# Patient Record
Sex: MALE | NOT HISPANIC OR LATINO | URBAN - METROPOLITAN AREA
[De-identification: names, ages, dates, MRNs, and addresses within clinical notes are randomized per-mention and may not be internally consistent; named-entity substitution may affect disease eponyms.]

---

## 2017-10-25 ENCOUNTER — EMERGENCY (EMERGENCY)
Facility: HOSPITAL | Age: 62
LOS: 1 days | Discharge: ANOTHER HOSPITAL | End: 2017-10-25
Attending: EMERGENCY MEDICINE | Admitting: EMERGENCY MEDICINE
Payer: COMMERCIAL

## 2017-10-25 ENCOUNTER — INPATIENT (INPATIENT)
Facility: HOSPITAL | Age: 62
LOS: 2 days | Discharge: HOME | DRG: 274 | End: 2017-10-28
Attending: HOSPITALIST | Admitting: STUDENT IN AN ORGANIZED HEALTH CARE EDUCATION/TRAINING PROGRAM
Payer: COMMERCIAL

## 2017-10-25 VITALS
DIASTOLIC BLOOD PRESSURE: 85 MMHG | RESPIRATION RATE: 12 BRPM | OXYGEN SATURATION: 100 % | HEART RATE: 66 BPM | SYSTOLIC BLOOD PRESSURE: 121 MMHG

## 2017-10-25 VITALS
OXYGEN SATURATION: 100 % | DIASTOLIC BLOOD PRESSURE: 80 MMHG | SYSTOLIC BLOOD PRESSURE: 127 MMHG | HEART RATE: 64 BPM | WEIGHT: 189.6 LBS | TEMPERATURE: 98 F | HEIGHT: 74 IN | RESPIRATION RATE: 15 BRPM

## 2017-10-25 VITALS
RESPIRATION RATE: 19 BRPM | OXYGEN SATURATION: 100 % | TEMPERATURE: 98 F | WEIGHT: 184.09 LBS | HEART RATE: 184 BPM | HEIGHT: 74 IN | DIASTOLIC BLOOD PRESSURE: 94 MMHG | SYSTOLIC BLOOD PRESSURE: 124 MMHG

## 2017-10-25 DIAGNOSIS — Z90.49 ACQUIRED ABSENCE OF OTHER SPECIFIED PARTS OF DIGESTIVE TRACT: Chronic | ICD-10-CM

## 2017-10-25 DIAGNOSIS — I10 ESSENTIAL (PRIMARY) HYPERTENSION: ICD-10-CM

## 2017-10-25 DIAGNOSIS — R07.9 CHEST PAIN, UNSPECIFIED: ICD-10-CM

## 2017-10-25 DIAGNOSIS — R07.89 OTHER CHEST PAIN: ICD-10-CM

## 2017-10-25 DIAGNOSIS — I47.1 SUPRAVENTRICULAR TACHYCARDIA: ICD-10-CM

## 2017-10-25 DIAGNOSIS — Z96.641 PRESENCE OF RIGHT ARTIFICIAL HIP JOINT: Chronic | ICD-10-CM

## 2017-10-25 LAB
ALBUMIN SERPL ELPH-MCNC: 4 G/DL — SIGNIFICANT CHANGE UP (ref 3.3–5)
ALP SERPL-CCNC: 58 U/L — SIGNIFICANT CHANGE UP (ref 30–120)
ALT FLD-CCNC: 23 U/L DA — SIGNIFICANT CHANGE UP (ref 10–60)
ANION GAP SERPL CALC-SCNC: 14 MMOL/L — SIGNIFICANT CHANGE UP (ref 5–17)
ANISOCYTOSIS BLD QL: SLIGHT — SIGNIFICANT CHANGE UP
APTT BLD: 30.4 SEC — SIGNIFICANT CHANGE UP (ref 27.5–37.4)
AST SERPL-CCNC: 31 U/L — SIGNIFICANT CHANGE UP (ref 10–40)
BASOPHILS # BLD AUTO: 0.1 K/UL — SIGNIFICANT CHANGE UP (ref 0–0.2)
BASOPHILS NFR BLD AUTO: 1.5 % — SIGNIFICANT CHANGE UP (ref 0–2)
BILIRUB SERPL-MCNC: 0.8 MG/DL — SIGNIFICANT CHANGE UP (ref 0.2–1.2)
BUN SERPL-MCNC: 17 MG/DL — SIGNIFICANT CHANGE UP (ref 7–23)
CALCIUM SERPL-MCNC: 9.7 MG/DL — SIGNIFICANT CHANGE UP (ref 8.4–10.5)
CHLORIDE SERPL-SCNC: 101 MMOL/L — SIGNIFICANT CHANGE UP (ref 96–108)
CK MB CFR SERPL CALC: 0.8 NG/ML — SIGNIFICANT CHANGE UP (ref 0–3.6)
CO2 SERPL-SCNC: 22 MMOL/L — SIGNIFICANT CHANGE UP (ref 22–31)
CREAT SERPL-MCNC: 0.95 MG/DL — SIGNIFICANT CHANGE UP (ref 0.5–1.3)
D DIMER BLD IA.RAPID-MCNC: <150 NG/ML DDU — SIGNIFICANT CHANGE UP
EOSINOPHIL # BLD AUTO: 0 K/UL — SIGNIFICANT CHANGE UP (ref 0–0.5)
EOSINOPHIL NFR BLD AUTO: 0.5 % — SIGNIFICANT CHANGE UP (ref 0–6)
GLUCOSE SERPL-MCNC: 135 MG/DL — HIGH (ref 70–99)
HCT VFR BLD CALC: 48.3 % — SIGNIFICANT CHANGE UP (ref 39–50)
HGB BLD-MCNC: 16.2 G/DL — SIGNIFICANT CHANGE UP (ref 13–17)
INR BLD: 0.9 RATIO — SIGNIFICANT CHANGE UP (ref 0.88–1.16)
LYMPHOCYTES # BLD AUTO: 1.6 K/UL — SIGNIFICANT CHANGE UP (ref 1–3.3)
LYMPHOCYTES # BLD AUTO: 25.1 % — SIGNIFICANT CHANGE UP (ref 13–44)
MACROCYTES BLD QL: SLIGHT — SIGNIFICANT CHANGE UP
MANUAL SMEAR VERIFICATION: SIGNIFICANT CHANGE UP
MCHC RBC-ENTMCNC: 33.5 GM/DL — SIGNIFICANT CHANGE UP (ref 32–36)
MCHC RBC-ENTMCNC: 36.1 PG — HIGH (ref 27–34)
MCV RBC AUTO: 107.7 FL — HIGH (ref 80–100)
MONOCYTES # BLD AUTO: 0.7 K/UL — SIGNIFICANT CHANGE UP (ref 0–0.9)
MONOCYTES NFR BLD AUTO: 10.6 % — SIGNIFICANT CHANGE UP (ref 2–14)
NEUTROPHILS # BLD AUTO: 4 K/UL — SIGNIFICANT CHANGE UP (ref 1.8–7.4)
NEUTROPHILS NFR BLD AUTO: 62.3 % — SIGNIFICANT CHANGE UP (ref 43–77)
NT-PROBNP SERPL-SCNC: 121 PG/ML — SIGNIFICANT CHANGE UP (ref 0–125)
PLAT MORPH BLD: NORMAL — SIGNIFICANT CHANGE UP
PLATELET # BLD AUTO: 232 K/UL — SIGNIFICANT CHANGE UP (ref 150–400)
PLATELET COUNT - ESTIMATE: NORMAL — SIGNIFICANT CHANGE UP
POIKILOCYTOSIS BLD QL AUTO: SLIGHT — SIGNIFICANT CHANGE UP
POTASSIUM SERPL-MCNC: 3.6 MMOL/L — SIGNIFICANT CHANGE UP (ref 3.5–5.3)
POTASSIUM SERPL-SCNC: 3.6 MMOL/L — SIGNIFICANT CHANGE UP (ref 3.5–5.3)
PROT SERPL-MCNC: 7.8 G/DL — SIGNIFICANT CHANGE UP (ref 6–8.3)
PROTHROM AB SERPL-ACNC: 9.8 SEC — SIGNIFICANT CHANGE UP (ref 9.8–12.7)
RBC # BLD: 4.49 M/UL — SIGNIFICANT CHANGE UP (ref 4.2–5.8)
RBC # FLD: 11.8 % — SIGNIFICANT CHANGE UP (ref 10.3–14.5)
RBC BLD AUTO: ABNORMAL
SODIUM SERPL-SCNC: 137 MMOL/L — SIGNIFICANT CHANGE UP (ref 135–145)
TROPONIN I SERPL-MCNC: 0 NG/ML — LOW (ref 0.02–0.06)
WBC # BLD: 6.4 K/UL — SIGNIFICANT CHANGE UP (ref 3.8–10.5)
WBC # FLD AUTO: 6.4 K/UL — SIGNIFICANT CHANGE UP (ref 3.8–10.5)

## 2017-10-25 PROCEDURE — 82553 CREATINE MB FRACTION: CPT

## 2017-10-25 PROCEDURE — 99285 EMERGENCY DEPT VISIT HI MDM: CPT

## 2017-10-25 PROCEDURE — 83880 ASSAY OF NATRIURETIC PEPTIDE: CPT

## 2017-10-25 PROCEDURE — 85027 COMPLETE CBC AUTOMATED: CPT

## 2017-10-25 PROCEDURE — 71010: CPT | Mod: 26

## 2017-10-25 PROCEDURE — 85730 THROMBOPLASTIN TIME PARTIAL: CPT

## 2017-10-25 PROCEDURE — 85379 FIBRIN DEGRADATION QUANT: CPT

## 2017-10-25 PROCEDURE — 71045 X-RAY EXAM CHEST 1 VIEW: CPT

## 2017-10-25 PROCEDURE — 84484 ASSAY OF TROPONIN QUANT: CPT

## 2017-10-25 PROCEDURE — 80053 COMPREHEN METABOLIC PANEL: CPT

## 2017-10-25 PROCEDURE — 36415 COLL VENOUS BLD VENIPUNCTURE: CPT

## 2017-10-25 PROCEDURE — 93010 ELECTROCARDIOGRAM REPORT: CPT | Mod: 76

## 2017-10-25 PROCEDURE — 99223 1ST HOSP IP/OBS HIGH 75: CPT

## 2017-10-25 PROCEDURE — 85610 PROTHROMBIN TIME: CPT

## 2017-10-25 RX ORDER — METOPROLOL TARTRATE 50 MG
12.5 TABLET ORAL ONCE
Qty: 0 | Refills: 0 | Status: COMPLETED | OUTPATIENT
Start: 2017-10-25 | End: 2017-10-25

## 2017-10-25 RX ORDER — ASPIRIN/CALCIUM CARB/MAGNESIUM 324 MG
325 TABLET ORAL ONCE
Qty: 0 | Refills: 0 | Status: COMPLETED | OUTPATIENT
Start: 2017-10-25 | End: 2017-10-25

## 2017-10-25 RX ORDER — ONDANSETRON 8 MG/1
4 TABLET, FILM COATED ORAL ONCE
Qty: 0 | Refills: 0 | Status: COMPLETED | OUTPATIENT
Start: 2017-10-25 | End: 2017-10-25

## 2017-10-25 RX ORDER — POTASSIUM CHLORIDE 20 MEQ
20 PACKET (EA) ORAL ONCE
Qty: 0 | Refills: 0 | Status: COMPLETED | OUTPATIENT
Start: 2017-10-25 | End: 2017-10-25

## 2017-10-25 RX ORDER — SODIUM CHLORIDE 9 MG/ML
3 INJECTION INTRAMUSCULAR; INTRAVENOUS; SUBCUTANEOUS ONCE
Qty: 0 | Refills: 0 | Status: COMPLETED | OUTPATIENT
Start: 2017-10-25 | End: 2017-10-25

## 2017-10-25 RX ORDER — ENOXAPARIN SODIUM 100 MG/ML
80 INJECTION SUBCUTANEOUS ONCE
Qty: 0 | Refills: 0 | Status: COMPLETED | OUTPATIENT
Start: 2017-10-25 | End: 2017-10-25

## 2017-10-25 RX ADMIN — ONDANSETRON 4 MILLIGRAM(S): 8 TABLET, FILM COATED ORAL at 22:38

## 2017-10-25 RX ADMIN — ENOXAPARIN SODIUM 80 MILLIGRAM(S): 100 INJECTION SUBCUTANEOUS at 21:53

## 2017-10-25 RX ADMIN — Medication 20 MILLIEQUIVALENT(S): at 21:52

## 2017-10-25 RX ADMIN — SODIUM CHLORIDE 3 MILLILITER(S): 9 INJECTION INTRAMUSCULAR; INTRAVENOUS; SUBCUTANEOUS at 19:27

## 2017-10-25 RX ADMIN — Medication 325 MILLIGRAM(S): at 20:55

## 2017-10-25 RX ADMIN — Medication 12.5 MILLIGRAM(S): at 21:52

## 2017-10-25 NOTE — PATIENT PROFILE ADULT. - ABILITY TO HEAR (WITH HEARING AID OR HEARING APPLIANCE IF NORMALLY USED):
wears hearing aides/Mildly to Moderately Impaired: difficulty hearing in some environments or speaker may need to increase volume or speak distinctly

## 2017-10-25 NOTE — H&P ADULT - PROBLEM SELECTOR PLAN 3
Patient with h/o hereditary hemochromatosis, receives thrice yearly phlebotomy as treatment  - monitor

## 2017-10-25 NOTE — H&P ADULT - NSHPPHYSICALEXAM_GEN_ALL_CORE
PHYSICAL EXAM:  GENERAL: NAD, well-developed  HEAD: Atraumatic, Normocephalic  EYES: EOMI, PERRLA, conjunctiva and sclera clear  NECK: Supple, No JVD  CHEST/LUNG: Clear to auscultation bilaterally; No wheezes/rales/rhonchi  HEART: Regular rate and rhythm; No murmurs, rubs, or gallops  ABDOMEN: Soft, Nontender, Nondistended; Bowel sounds present  EXTREMITIES:  2+ dP pulses b/l, No clubbing, cyanosis, or edema  PSYCH: reactive affect  NEUROLOGY: AAOx3, non-focal  SKIN: No rashes or lesions PHYSICAL EXAM:  Vital Signs: T(F): 98.1 (25 Oct 2017 23:18), Max: 98.1 (25 Oct 2017 23:18), HR: 62 (25 Oct 2017 23:30) (62 - 184), BP: 119/80 (25 Oct 2017 23:30) (119/80 - 127/80), RR: 16 (25 Oct 2017 23:30) (12 - 19), SpO2: 99% (25 Oct 2017 23:30) (99% - 100%)  GENERAL: NAD, well-developed  HEAD: Atraumatic, Normocephalic  EYES: EOMI, PERRLA, conjunctiva and sclera clear  NECK: Supple, No JVD  CHEST/LUNG: Clear to auscultation bilaterally; No wheezes/rales/rhonchi  HEART: Regular rate and rhythm; No murmurs, rubs, or gallops  ABDOMEN: Soft, Nontender, Nondistended; Bowel sounds present  EXTREMITIES:  2+ dP pulses b/l, No clubbing, cyanosis, or edema  PSYCH: reactive affect  NEUROLOGY: AAOx3, non-focal  SKIN: No rashes or lesions

## 2017-10-25 NOTE — H&P ADULT - PROBLEM SELECTOR PLAN 1
Patient presented with episode of SVT with diffuse ST depression and elevation in AvR on EKG concerning for ischemia converted to NSR s/p adenosine x 3 and cardizem x 1  - admit to CCU for close monitoring  - will likely need ischemic work up given ST-T changes on EKG  - check TTE  - trend Papo (one set negative so far at OSH)

## 2017-10-25 NOTE — H&P ADULT - NSHPSOCIALHISTORY_GEN_ALL_CORE
He is an intermittent cigar smoker, approximately twice per month, and regular wine drinker, having 1-2 glasses daily. Denies recreational drug use. He works in logistics requiring frequent travel.

## 2017-10-25 NOTE — ED PROVIDER NOTE - MEDICAL DECISION MAKING DETAILS
63 y.o. M with SVT, cp/back pain, improved to NSR after 3 doses adenosine and 1 dose diltiazem, abnormal EKG, first set enz negative, seen by cardiology, recommends transfer to [facility] for cath and ep study

## 2017-10-25 NOTE — H&P ADULT - ASSESSMENT
61 yo M w/PMH of hemochromatosis treated with phlebotomy 3 x per year, chronic fatigue syndrome and HTN who presented to [facility] ED c/o CP radiating to his back associated with palpitations, SOB and dizziness found to have SVT at 179bpm with ST depressions throughout and elevations in AvR transferred to [facility] for ischemic work up

## 2017-10-25 NOTE — CONSULT NOTE ADULT - ASSESSMENT
Mr Kate is  a 63 yo male with hx. of HTN,hemachromatosis, chronic fatigue syndrome visiting fro [place] who developed palpitations with sharp- dull left anterior chest pain radiating to his upper back and left shoulder while at rest. Mild worsening with inspiration. No relation to postural changes or exertion. He was diaphoretic, dizzy  and nauseous without vomiting. Ambulance was called and he was found to be in SVT at 180 BPM. He received 2 doses of Adenosine in the ambulance without any change. In the ER he was given additional dose of Adenosine and then a dose odf IV Cardizem with conversion to sinus rhythm. After conversion he relates chest discomfort ( which was 10/10) has almost resolved with only mild residual ache present. He is no longer SOB.    He relates experiencing a similar but less intense episode approx. 1 year ago for which he went to an ER in [place] and apparently converted with ? iV medication. He has had intermittent , very brief, self limited episodes. He relates that episodes often resolve by drinking cold H2O.  He was seen by a cardiologist in [place]  and relates having had a negative stress test ( without imaging).     He typically denies Sob or Stokes, exertional chest pain. No ankle edema, presyncope or syncope.     He exercises regularly ( swims for 30 min) without Sob or chest pain.    Risk factors for CAD include HTN ,hyperlipidemia. He denies cigarette smoking, DM. He denies FH of CAd

## 2017-10-25 NOTE — ED ADULT NURSE NOTE - ED STAT RN HANDOFF DETAILS
Report given to CCU MARLYN Perdue via transfer center by Yessy. Patient aware of plan of care. Cardiac monitor in place. See flow sheet for   VS. Will continue to monitor.

## 2017-10-25 NOTE — H&P ADULT - NSHPREVIEWOFSYSTEMS_GEN_ALL_CORE
Constitutional: denies fevers, chills, night sweats, weight loss  HEENT: denies visual changes, hearing changes, rhinitis, odynophagia, or dysphagia  Cardiovascular: denies palpitations, chest pain, edema  Respiratory: denies SOB, wheezing  Gastrointestinal: denies N/V/D, abdominal pain, hematochezia, melena  : denies dysuria, hematuria  MSK: denies weakness, joint pain  Neuro: no numbness or tingling  Psych: no depression or anxiety  Skin: denies new rashes or masses

## 2017-10-25 NOTE — ED PROVIDER NOTE - OBJECTIVE STATEMENT
63 y/o M pt with hx of hemochromatosis and HTNpresents to the ED by ambulance c/o chest pain radiating to his back. Pt states had an episode like this a year ago, drank cold water and it went away. Pt is also c/o shortness of breath and dizziness. Pt was given adenosine x2 in the ambulance. Pt took 3 Excedrin at [facility]. Pt arrived from [place] 3 days ago. Pt takes Hydrochlorothiazide and Losartan. Denies nausea, vomiting. No further complaints at this time. 63 y/o M pt with hx of hemochromatosis and HTN presents to the ED by ambulance c/o chest pain radiating to his back. Pt states had an episode like this a year ago, drank cold water and it went away. Pt is also c/o shortness of breath and dizziness. Pt was given adenosine x2 in the ambulance. Pt took 3 Excedrin at [facility]. Pt arrived from [place] 3 days ago. Pt takes Hydrochlorothiazide and Losartan. Denies nausea, vomiting. No further complaints at this time. 63 y/o M pt with hx of hemochromatosis and HTN presents to the ED by ambulance c/o chest pain radiating to his back. Pt states had an episode like this a year ago, drank cold water and it went away. Pt is also c/o shortness of breath and dizziness. Pt was given adenosine x2 in the ambulance. Pt took 3 Excedrin at [facility]. Pt arrived from [place] 3 days ago. Pt takes Hydrochlorothiazide and Losartan. Denies nausea, vomiting. No further complaints at this time.    Son Jameson

## 2017-10-25 NOTE — CONSULT NOTE ADULT - SUBJECTIVE AND OBJECTIVE BOX
HPI:  Mr Kate is  a 61 yo male with hx. of HTN,hemachromatosis, chronic fatigue syndrome visiting fro [place] who developed palpitations with sharp- dull left anterior chest pain radiating to his upper back and left shoulder while at rest. Mild worsening with inspiration. No relation to postural changes or exertion. He was diaphoretic, dizzy  and nauseous without vomiting. Ambulance was called and he was found to be in SVT at 180 BPM. He received 2 doses of Adenosine in the ambulance without any change. In the ER he was given additional dose of Adenosine and then a dose odf IV Cardizem with conversion to sinus rhythm. After conversion he relates chest discomfort ( which was 10/10) has almost resolved with only mild residual ache present. He is no longer SOB.    He relates experiencing a similar but less intense episode approx. 1 year ago for which he went to an ER in [place] and apparently converted with ? iV medication. He has had intermittent , very brief, self limited episodes. He relates that episodes often resolve by drinking cold H2O.  He was seen by a cardiologist in [place]  and relates having had a negative stress test ( without imaging).     He typically denies Sob or Stokes, exertional chest pain. No ankle edema, presyncope or syncope.     He exercises regularly ( swims for 30 min) without Sob or chest pain.    Risk factors for CAD include HTN ,hyperlipidemia. He denies cigarette smoking, DM. He denies FH of CAd      PAST MEDICAL & SURGICAL HISTORY:  HTN (hypertension)  Right THR  cholecystectomy.       Allergies    No Known Allergies      SOCIAL HISTORY: 1-2 glasses wine/ day.  No cigarettes.    FAMILY HISTORY: Not contributory.       MEDICATIONS: Losartan /HcTZ      REVIEW OF SYSTEMS:    CONSTITUTIONAL: see above occasional HA ( not presently0.   EYES/ENT: No visual changes;   RESPIRATORY see HPI  CARDIOVASCULAR: see HPI  GASTROINTESTINAL: see HPI.     All other review of systems is negative/ not contributory  unless indicated above    Vital Signs Last 24 Hrs  T(C): 36.7 (25 Oct 2017 18:50), Max: 36.7 (25 Oct 2017 18:50)  T(F): 98 (25 Oct 2017 18:50), Max: 98 (25 Oct 2017 18:50)  HR: 70 (25 Oct 2017 19:47) (62 - 184)  BP: 124/80 (25 Oct 2017 19:47) (123/80 - 124/94)  BP(mean): --  RR: 14 (25 Oct 2017 19:47) (12 - 19)  SpO2: 100% (25 Oct 2017 19:47) (100% - 100%)    I&O's Summary      PHYSICAL EXAM:    Constitutional: , awake and alert, well-developed; mild chest discomfort.   HEENT: NC/AT. Anicteric. No oral cyanosis.   Neck:  supple,  No JVD  Respiratory: Breath sounds are clear bilaterally, No wheezing, rales or rhonchi  Cardiovascular: S1 and S2, regular  rhythm, no Murmurs, gallops or rubs  Gastrointestinal: Bowel Sounds present, soft, nontender.   Extremities: No peripheral edema. No clubbing or cyanosis.  Vascular: 2+ peripheral pulses  Neurological: A/O x 3,  Musculoskeletal: no calf tenderness.  Skin: No rashes.      LABS: All Labs Reviewed:                        16.2   6.4   )-----------( 232      ( 25 Oct 2017 19:26 )             48.3     25 Oct 2017 19:26    137    |  101    |  17     ----------------------------<  135    3.6     |  22     |  0.95     Ca    9.7        25 Oct 2017 19:26    TPro  7.8    /  Alb  4.0    /  TBili  0.8    /  DBili  x      /  AST  31     /  ALT  23     /  AlkPhos  58     25 Oct 2017 19:26    PT/INR - ( 25 Oct 2017 19:26 )   PT: 9.8 sec;   INR: 0.90 ratio         PTT - ( 25 Oct 2017 19:26 )  PTT:30.4 sec  CARDIAC MARKERS ( 25 Oct 2017 19:26 )  .000 ng/mL / x     / x     / x     / 0.8 ng/mL    D-dimer - nl.           10-25 @ 19:26  Pro Bnp 121    RADIOLOGY/EKG:    EKG 10/25/17 - 18:53:12 -  bPM. ST depression V3-V6; lead 1 and inferiorly    EKG 10/25/17 - 19:00:38 - Sinus rhythm 62 BPM. St depression V3-V6; lead 2, avf ( less than before)    EKG 10/25/17 - 19:19;50 - sinus herminio 59 BPM. decreased ST depression.     EKG 10/25/17 - 19:19:50 - Sinus herminio 59 BPM - Decresed ST depression

## 2017-10-25 NOTE — CONSULT NOTE ADULT - PROBLEM SELECTOR RECOMMENDATION 2
severe as described above. + ischemic St abnl. when in SVT. Troponin x1 negative ( as was d-dimer and proBNP). Presently with only mild residual ache and improved St depression. Will need evaluation for CAD. Given severity of presenting scenario suggest cardiac catheterization. Has received ASA. Advise beta blocker and SQ full dose Lovenox for now. Check lipid status. Echocardiogram

## 2017-10-25 NOTE — ED PROVIDER NOTE - MUSCULOSKELETAL, MLM
+COMPLAINING OF BACK PAIN. Spine appears normal, range of motion is not limited, no muscle or joint tenderness

## 2017-10-25 NOTE — CONSULT NOTE ADULT - PROBLEM SELECTOR RECOMMENDATION 9
associated with severe chest pain and SOB. EKG with diffuse ST depressions while in SVT with improvement upon conversion to sinus rhythm. Has had similar, but less intense episodes in the past. Suggest EP consult re: possible ablation. Begin on oral betas blocker. check mg+ level and TFTs. Replete K+.

## 2017-10-25 NOTE — ED PROVIDER NOTE - NOTES
Here for consult fellow states not STEMI, and no CP, only pain left shoulder, does not need cath right now.  21:13 - pt has been accepted by Dr. Boateng for transfer Here for consult - rec transfer to [facility], cath and EP study

## 2017-10-25 NOTE — H&P ADULT - HISTORY OF PRESENT ILLNESS
Patient is a 61 yo M w/PMH of hemochromatosis treated with phlebotomy 3 x per year, chronic fatigue syndrome and HTN who presented to [facility] ED c/o CP radiating to his back associated with palpitations, SOB and dizziness found to have SVT at 180bpm. In the ambulance, he received 2 doses of adenosine in the ambulance without any change, then received an additional dose of adenosine and IV cardizem in the ED with conversion to NSR. The patient reports a similar episode one year ago for which he presented to an ER in [place] (his home). Reports that he believes the rhythm converted on its own. He saw a cardiologist afterwards and underwent echo and stress test which were both reportedly normal. States he has had intermittent episodes that usually resolve with drinking cold water or spontaneously for a few years. States he has never had an EKG during an episode that showed the rhythm and has never worn a holter monitor.    He is currently in NSR, states his chest pain and SOB completely resolved after conversion. Reports some continued left shoulder discomfort. Denies fevers, chills, nausea, vomiting. Reports he is an intermittent cigar smoker, approximately twice per month, and regular wine drinker, having 1-2 glasses daily. Denies recreational drug use.    On presentation to the [facility] ED, VS: T 98    /94  RR 19  SpO2 100% on RA  CBC, CMP unremarkable. Papo negative x 1. CXR clear. He was given  x 1, Lovenox 80 Sq x 1, Metoprolol 12.5mg x 1 and 20mEq of K.    On presentation to [facility], VS: T 98.1  HR 64  /80  RR 15  SpO2 100% on O2 by NC

## 2017-10-25 NOTE — ED ADULT NURSE NOTE - OBJECTIVE STATEMENT
Pt c/o chest pain radiating to back 10/10 pain pt has h/o hypertension denies any extensive exercise before chest pain onset. pt arrives [place] with rapid heart rate >130 bpm

## 2017-10-26 DIAGNOSIS — I47.1 SUPRAVENTRICULAR TACHYCARDIA: ICD-10-CM

## 2017-10-26 DIAGNOSIS — I10 ESSENTIAL (PRIMARY) HYPERTENSION: ICD-10-CM

## 2017-10-26 DIAGNOSIS — E83.119 HEMOCHROMATOSIS, UNSPECIFIED: ICD-10-CM

## 2017-10-26 DIAGNOSIS — Z29.9 ENCOUNTER FOR PROPHYLACTIC MEASURES, UNSPECIFIED: ICD-10-CM

## 2017-10-26 LAB
ALBUMIN SERPL ELPH-MCNC: 4 G/DL — SIGNIFICANT CHANGE UP (ref 3.3–5)
ALBUMIN SERPL ELPH-MCNC: 4.1 G/DL — SIGNIFICANT CHANGE UP (ref 3.3–5)
ALP SERPL-CCNC: 41 U/L — SIGNIFICANT CHANGE UP (ref 40–120)
ALP SERPL-CCNC: 46 U/L — SIGNIFICANT CHANGE UP (ref 40–120)
ALT FLD-CCNC: 16 U/L RC — SIGNIFICANT CHANGE UP (ref 10–45)
ALT FLD-CCNC: 19 U/L RC — SIGNIFICANT CHANGE UP (ref 10–45)
ANION GAP SERPL CALC-SCNC: 12 MMOL/L — SIGNIFICANT CHANGE UP (ref 5–17)
ANION GAP SERPL CALC-SCNC: 13 MMOL/L — SIGNIFICANT CHANGE UP (ref 5–17)
APTT BLD: 33.2 SEC — SIGNIFICANT CHANGE UP (ref 27.5–37.4)
AST SERPL-CCNC: 27 U/L — SIGNIFICANT CHANGE UP (ref 10–40)
AST SERPL-CCNC: 32 U/L — SIGNIFICANT CHANGE UP (ref 10–40)
BASOPHILS # BLD AUTO: 0 K/UL — SIGNIFICANT CHANGE UP (ref 0–0.2)
BASOPHILS NFR BLD AUTO: 0.6 % — SIGNIFICANT CHANGE UP (ref 0–2)
BILIRUB SERPL-MCNC: 0.7 MG/DL — SIGNIFICANT CHANGE UP (ref 0.2–1.2)
BILIRUB SERPL-MCNC: 0.8 MG/DL — SIGNIFICANT CHANGE UP (ref 0.2–1.2)
BLD GP AB SCN SERPL QL: NEGATIVE — SIGNIFICANT CHANGE UP
BUN SERPL-MCNC: 14 MG/DL — SIGNIFICANT CHANGE UP (ref 7–23)
BUN SERPL-MCNC: 17 MG/DL — SIGNIFICANT CHANGE UP (ref 7–23)
CALCIUM SERPL-MCNC: 9 MG/DL — SIGNIFICANT CHANGE UP (ref 8.4–10.5)
CALCIUM SERPL-MCNC: 9.4 MG/DL — SIGNIFICANT CHANGE UP (ref 8.4–10.5)
CHLORIDE SERPL-SCNC: 101 MMOL/L — SIGNIFICANT CHANGE UP (ref 96–108)
CHLORIDE SERPL-SCNC: 104 MMOL/L — SIGNIFICANT CHANGE UP (ref 96–108)
CHOLEST SERPL-MCNC: 230 MG/DL — HIGH (ref 10–199)
CK MB CFR SERPL CALC: 2.7 NG/ML — SIGNIFICANT CHANGE UP (ref 0–6.7)
CK MB CFR SERPL CALC: 2.7 NG/ML — SIGNIFICANT CHANGE UP (ref 0–6.7)
CK SERPL-CCNC: 77 U/L — SIGNIFICANT CHANGE UP (ref 30–200)
CK SERPL-CCNC: 86 U/L — SIGNIFICANT CHANGE UP (ref 30–200)
CO2 SERPL-SCNC: 24 MMOL/L — SIGNIFICANT CHANGE UP (ref 22–31)
CO2 SERPL-SCNC: 24 MMOL/L — SIGNIFICANT CHANGE UP (ref 22–31)
CREAT SERPL-MCNC: 0.89 MG/DL — SIGNIFICANT CHANGE UP (ref 0.5–1.3)
CREAT SERPL-MCNC: 1.05 MG/DL — SIGNIFICANT CHANGE UP (ref 0.5–1.3)
EOSINOPHIL # BLD AUTO: 0.1 K/UL — SIGNIFICANT CHANGE UP (ref 0–0.5)
EOSINOPHIL NFR BLD AUTO: 1.2 % — SIGNIFICANT CHANGE UP (ref 0–6)
GLUCOSE SERPL-MCNC: 89 MG/DL — SIGNIFICANT CHANGE UP (ref 70–99)
GLUCOSE SERPL-MCNC: 96 MG/DL — SIGNIFICANT CHANGE UP (ref 70–99)
HBA1C BLD-MCNC: 5.2 % — SIGNIFICANT CHANGE UP (ref 4–5.6)
HCT VFR BLD CALC: 42.7 % — SIGNIFICANT CHANGE UP (ref 39–50)
HCT VFR BLD CALC: 42.9 % — SIGNIFICANT CHANGE UP (ref 39–50)
HDLC SERPL-MCNC: 93 MG/DL — SIGNIFICANT CHANGE UP (ref 40–125)
HGB BLD-MCNC: 14.9 G/DL — SIGNIFICANT CHANGE UP (ref 13–17)
HGB BLD-MCNC: 15.2 G/DL — SIGNIFICANT CHANGE UP (ref 13–17)
INR BLD: 0.97 RATIO — SIGNIFICANT CHANGE UP (ref 0.88–1.16)
LIPID PNL WITH DIRECT LDL SERPL: 128 MG/DL — SIGNIFICANT CHANGE UP
LYMPHOCYTES # BLD AUTO: 1.8 K/UL — SIGNIFICANT CHANGE UP (ref 1–3.3)
LYMPHOCYTES # BLD AUTO: 35 % — SIGNIFICANT CHANGE UP (ref 13–44)
MAGNESIUM SERPL-MCNC: 2.3 MG/DL — SIGNIFICANT CHANGE UP (ref 1.6–2.6)
MAGNESIUM SERPL-MCNC: 2.3 MG/DL — SIGNIFICANT CHANGE UP (ref 1.6–2.6)
MCHC RBC-ENTMCNC: 34.9 GM/DL — SIGNIFICANT CHANGE UP (ref 32–36)
MCHC RBC-ENTMCNC: 35.3 GM/DL — SIGNIFICANT CHANGE UP (ref 32–36)
MCHC RBC-ENTMCNC: 38.2 PG — HIGH (ref 27–34)
MCHC RBC-ENTMCNC: 38.7 PG — HIGH (ref 27–34)
MCV RBC AUTO: 109 FL — HIGH (ref 80–100)
MCV RBC AUTO: 110 FL — HIGH (ref 80–100)
MONOCYTES # BLD AUTO: 0.7 K/UL — SIGNIFICANT CHANGE UP (ref 0–0.9)
MONOCYTES NFR BLD AUTO: 13 % — SIGNIFICANT CHANGE UP (ref 2–14)
NEUTROPHILS # BLD AUTO: 2.5 K/UL — SIGNIFICANT CHANGE UP (ref 1.8–7.4)
NEUTROPHILS NFR BLD AUTO: 50.2 % — SIGNIFICANT CHANGE UP (ref 43–77)
PHOSPHATE SERPL-MCNC: 3.8 MG/DL — SIGNIFICANT CHANGE UP (ref 2.5–4.5)
PHOSPHATE SERPL-MCNC: 4.6 MG/DL — HIGH (ref 2.5–4.5)
PLATELET # BLD AUTO: 211 K/UL — SIGNIFICANT CHANGE UP (ref 150–400)
PLATELET # BLD AUTO: 215 K/UL — SIGNIFICANT CHANGE UP (ref 150–400)
POTASSIUM SERPL-MCNC: 4.2 MMOL/L — SIGNIFICANT CHANGE UP (ref 3.5–5.3)
POTASSIUM SERPL-MCNC: 5.1 MMOL/L — SIGNIFICANT CHANGE UP (ref 3.5–5.3)
POTASSIUM SERPL-SCNC: 4.2 MMOL/L — SIGNIFICANT CHANGE UP (ref 3.5–5.3)
POTASSIUM SERPL-SCNC: 5.1 MMOL/L — SIGNIFICANT CHANGE UP (ref 3.5–5.3)
PROT SERPL-MCNC: 6 G/DL — SIGNIFICANT CHANGE UP (ref 6–8.3)
PROT SERPL-MCNC: 6.7 G/DL — SIGNIFICANT CHANGE UP (ref 6–8.3)
PROTHROM AB SERPL-ACNC: 10.5 SEC — SIGNIFICANT CHANGE UP (ref 9.8–12.7)
RBC # BLD: 3.91 M/UL — LOW (ref 4.2–5.8)
RBC # BLD: 3.92 M/UL — LOW (ref 4.2–5.8)
RBC # FLD: 11.5 % — SIGNIFICANT CHANGE UP (ref 10.3–14.5)
RBC # FLD: 11.7 % — SIGNIFICANT CHANGE UP (ref 10.3–14.5)
RH IG SCN BLD-IMP: NEGATIVE — SIGNIFICANT CHANGE UP
SODIUM SERPL-SCNC: 138 MMOL/L — SIGNIFICANT CHANGE UP (ref 135–145)
SODIUM SERPL-SCNC: 140 MMOL/L — SIGNIFICANT CHANGE UP (ref 135–145)
TOTAL CHOLESTEROL/HDL RATIO MEASUREMENT: 2.5 RATIO — LOW (ref 3.4–9.6)
TRIGL SERPL-MCNC: 44 MG/DL — SIGNIFICANT CHANGE UP (ref 10–149)
TROPONIN T SERPL-MCNC: 0.04 NG/ML — SIGNIFICANT CHANGE UP (ref 0–0.06)
TROPONIN T SERPL-MCNC: <0.01 NG/ML — SIGNIFICANT CHANGE UP (ref 0–0.06)
TSH SERPL-MCNC: 1.88 UIU/ML — SIGNIFICANT CHANGE UP (ref 0.27–4.2)
WBC # BLD: 5 K/UL — SIGNIFICANT CHANGE UP (ref 3.8–10.5)
WBC # BLD: 7.3 K/UL — SIGNIFICANT CHANGE UP (ref 3.8–10.5)
WBC # FLD AUTO: 5 K/UL — SIGNIFICANT CHANGE UP (ref 3.8–10.5)
WBC # FLD AUTO: 7.3 K/UL — SIGNIFICANT CHANGE UP (ref 3.8–10.5)

## 2017-10-26 PROCEDURE — 93306 TTE W/DOPPLER COMPLETE: CPT | Mod: 26

## 2017-10-26 PROCEDURE — 99291 CRITICAL CARE FIRST HOUR: CPT

## 2017-10-26 PROCEDURE — 99152 MOD SED SAME PHYS/QHP 5/>YRS: CPT | Mod: GC

## 2017-10-26 PROCEDURE — 93454 CORONARY ARTERY ANGIO S&I: CPT | Mod: 26,GC

## 2017-10-26 PROCEDURE — 93010 ELECTROCARDIOGRAM REPORT: CPT

## 2017-10-26 RX ORDER — LOSARTAN POTASSIUM 100 MG/1
0 TABLET, FILM COATED ORAL
Qty: 0 | Refills: 0 | COMMUNITY

## 2017-10-26 RX ORDER — METOPROLOL TARTRATE 50 MG
12.5 TABLET ORAL
Qty: 0 | Refills: 0 | Status: DISCONTINUED | OUTPATIENT
Start: 2017-10-26 | End: 2017-10-28

## 2017-10-26 RX ORDER — HYDROCHLOROTHIAZIDE 25 MG
1 TABLET ORAL
Qty: 0 | Refills: 0 | COMMUNITY

## 2017-10-26 RX ORDER — PREGABALIN 225 MG/1
1 CAPSULE ORAL
Qty: 0 | Refills: 0 | COMMUNITY

## 2017-10-26 RX ORDER — METOPROLOL TARTRATE 50 MG
12.5 TABLET ORAL
Qty: 0 | Refills: 0 | Status: DISCONTINUED | OUTPATIENT
Start: 2017-10-26 | End: 2017-10-26

## 2017-10-26 RX ADMIN — Medication 12.5 MILLIGRAM(S): at 15:26

## 2017-10-26 NOTE — CONSULT NOTE ADULT - ASSESSMENT
63 yo M w/PMH of hemochromatosis treated with phlebotomy 3 x per year, chronic fatigue syndrome and HTN who presented to [facility] ED c/o CP radiating to his back associated with palpitations, SOB and dizziness found to have SVT at 180bpm likely AVNRT that is adenosine/diltiazem responsive.    -- will discuss ablation vs medical management with EP attending    Charli Nichols MD

## 2017-10-26 NOTE — CHART NOTE - NSCHARTNOTEFT_GEN_A_CORE
63 yo M w/PMH of hemochromatosis treated with phlebotomy 3 x per year, chronic fatigue syndrome and HTN who presented to [facility] ED c/o CP radiating to his back associated with palpitations, SOB and dizziness found to have SVT at 180bpm. In the ambulance, he received 2 doses of adenosine without any change, then received an additional dose of adenosine and IV cardizem in the ED with conversion to NSR. Pt currently in NSR, states his chest pain and SOB completely resolved after conversion. Reports some continued left shoulder discomfort.  Cardiac enzymes negative w/ non-specific ischemic changes on ECG (ST depressions precordially, AVR KUN). TTE (10/26) shows normal LA w/ normal LV systolic function. Left heart cath (10/26) shows non-obstructive CAD. Pt started on metoprolol 12.5mg po BID for stable angina/AVNRT. Pt is NPO after midnight for EP procedure. 61 yo M w/PMH of hemochromatosis treated with phlebotomy 3 x per year, chronic fatigue syndrome and HTN who presented to [facility] ED c/o CP radiating to his back associated with palpitations, SOB and dizziness found to have SVT at 180bpm. In the ambulance, he received 2 doses of adenosine without any change, then received an additional dose of adenosine and IV cardizem in the ED with conversion to NSR. Pt currently in NSR, states his chest pain and SOB completely resolved after conversion. Reports some continued left shoulder discomfort.  Cardiac enzymes negative w/ non-specific ischemic changes on ECG (ST depressions precordially, AVR KUN). TTE (10/26) shows normal LA w/ normal LV systolic function. Left heart cath (10/26) shows non-obstructive CAD. Pt started on metoprolol 12.5mg po BID for stable angina/AVNRT. Pt is NPO after midnight for EP study tmw AM. EP following.

## 2017-10-26 NOTE — CONSULT NOTE ADULT - SUBJECTIVE AND OBJECTIVE BOX
Patient seen and evaluated @ 4 AM  Chief Complaint: Chest pain    HPI:  Patient is a 61 yo M w/PMH of hemochromatosis treated with phlebotomy 3 x per year, chronic fatigue syndrome and HTN who presented to [facility] ED c/o CP radiating to his back associated with palpitations, SOB and dizziness found to have SVT at 180bpm. In the ambulance, he received 2 doses of adenosine in the ambulance without any change, then received an additional dose of adenosine and IV cardizem in the ED with conversion to NSR. The patient reports a similar episode one year ago for which he presented to an ER in [place] (his home). Reports that he believes the rhythm converted on its own. He saw a cardiologist afterwards and underwent echo and stress test which were both reportedly normal. States he has had intermittent episodes that usually resolve with drinking cold water or spontaneously for a few years. States he has never had an EKG during an episode that showed the rhythm and has never worn a holter monitor.    He is currently in NSR, states his chest pain and SOB completely resolved after conversion. Reports some continued left shoulder discomfort. Denies fevers, chills, nausea, vomiting. Reports he is an intermittent cigar smoker, approximately twice per month, and regular wine drinker, having 1-2 glasses daily. Denies recreational drug use.    On presentation to the [facility] ED, VS: T 98    /94  RR 19  SpO2 100% on RA  CBC, CMP unremarkable. Papo negative x 1. CXR clear. He was given  x 1, Lovenox 80 Sq x 1, Metoprolol 12.5mg x 1 and 20mEq of K.    On presentation to [facility], VS: T 98.1  HR 64  /80  RR 15  SpO2 100% on O2 by NC (25 Oct 2017 23:47)    PMH:   Chronic fatigue syndrome  Hemochromatosis  HTN (hypertension)    PSH:   History of hip replacement, total, right  History of cholecystectomy    Medications:  Losartan  HTCZ    Allergies:  No Known Allergies    FAMILY HISTORY:  No pertinent family history in first degree relatives    Social History:  None    Review of Systems:  Constitutional: [ ] Fever [ ] Chills [ ] Fatigue [ ] Weight change   HEENT: [ ] Blurred vision [ ] Eye Pain [ ] Headache [ ] Runny nose [ ] Sore Throat   Respiratory: [ ] Cough [ ] Wheezing [ ] Shortness of breath  Cardiovascular: [ ] Chest Pain [ ] Palpitations [ ] R[place] [ ] PND [ ] Orthopnea  Gastrointestinal: [ ] Abdominal Pain [ ] Diarrhea [ ] Constipation [ ] Hemorrhoids [ ] Nausea [ ] Vomiting  Genitourinary: [ ] Nocturia [ ] Dysuria [ ] Incontinence  Extremities: [ ] Swelling [ ] Joint Pain  Neurologic: [ ] Focal deficit [ ] Paresthesias [ ] Syncope  Lymphatic: [ ] Swelling [ ] Lymphadenopathy   Skin: [ ] Rash [ ] Ecchymoses [ ] Wounds [ ] Lesions  Psychiatry: [ ] Depression [ ] Suicidal/Homicidal Ideation [ ] Anxiety [ ] Sleep Disturbances  [x] 10 point review of systems is otherwise negative except as mentioned above            [ ]Unable to obtain    Physical Exam:  T(C): 36.7 (10-25-17 @ 23:18), Max: 36.7 (10-25-17 @ 18:50)  HR: 56 (10-26-17 @ 03:00) (56 - 184)  BP: 101/66 (10-26-17 @ 03:00) (97/65 - 130/90)  RR: 20 (10-26-17 @ 03:00) (12 - 33)  SpO2: 100% (10-26-17 @ 03:00) (98% - 100%)  Wt(kg): --    10-25 @ 07:01  -  10-26 @ 03:53  --------------------------------------------------------  IN: 120 mL / OUT: 0 mL / NET: 120 mL      Daily Height in cm: 187.96 (25 Oct 2017 23:18)    Daily     Appearance: NAD  Eyes: PERRL, EOMI  HENT: Normal oral muscosa, NC/AT  Cardiovascular: normal S1 and S2, RRR, no m/r/g, no edema, normal JVP  Respiratory: Clear to auscultation bilaterally  Gastrointestinal: Soft, non-tender, non-distended, BS+  Musculoskeletal: No clubbing, no joint deformity   Neurologic: Non-focal  Lymphatic: No lymphadenopathy  Psychiatry: AAOx3, mood & affect appropriate  Skin: No rashes, no ecchymoses, no cyanosis    Cardiovascular Diagnostic Testing:  ECG:  sinus 65 bpm    Echo: none    Stress Testing: reportedly normal 1 year ago    Cath: none    Interpretation of Telemetry: sinus    Imaging:  pending    Labs:                        15.2   7.3   )-----------( 215      ( 26 Oct 2017 00:17 )             42.9     10-26    138  |  101  |  17  ----------------------------<  96  5.1   |  24  |  1.05    Ca    9.4      26 Oct 2017 00:17  Phos  4.6     10-26  Mg     2.3     10-26    TPro  6.7  /  Alb  4.1  /  TBili  0.7  /  DBili  x   /  AST  32  /  ALT  19  /  AlkPhos  46  10-26    PT/INR - ( 26 Oct 2017 00:17 )   PT: 10.5 sec;   INR: 0.97 ratio         PTT - ( 26 Oct 2017 00:17 )  PTT:33.2 sec  CARDIAC MARKERS ( 26 Oct 2017 00:17 )  x     / 0.04 ng/mL / 86 U/L / x     / 2.7 ng/mL  CARDIAC MARKERS ( 25 Oct 2017 19:26 )  .000 ng/mL / x     / x     / x     / 0.8 ng/mL      Serum Pro-Brain Natriuretic Peptide: 121 pg/mL (10-25 @ 19:26)

## 2017-10-26 NOTE — PROGRESS NOTE ADULT - ASSESSMENT
61 yo M w/PMH of hemochromatosis treated with phlebotomy 3 x per year, chronic fatigue syndrome and HTN who presented to [facility] ED c/o CP radiating to his back associated with palpitations, SOB and dizziness found to have SVT at 179bpm with ST depressions throughout and elevations in AvR transferred to [facility] for ischemic work up    #Neuro:   - no issues, AAOx4    #CV:   - Patient with SVT and ischemic ST changes on EKG  - now back in NSR s/p 6/12/12 adenosine and 10 IVP Cardizem at [place]  - f/u EP recs re: possible ablation  - f/u TTE  - f/u re: need for cath    #Resp:   - no issues    #GI:   - no issues  - c/w DASH diet    #ID:  - no issues    #Metabolic/Renal  - no issues    #Heme  - patient with history of hemochromatosis, stable  - continue to monitor    #Endo:   - f/u A1c    #DVT ppx: s/p 80mg SQ Lovenox at OSH, holding further AC pending decision for possible procedure 61 yo M w/PMH of hemochromatosis treated with phlebotomy 3 x per year, chronic fatigue syndrome and HTN who presented to [facility] ED c/o CP radiating to his back associated with palpitations, SOB and dizziness found to have SVT at 179bpm with ST depressions throughout and elevations in AvR transferred to [facility] for ischemic work up    #Neuro:   - no issues, AAOx4    #CV:   - Patient with SVT and ischemic ST changes on EKG, Papo negative x 2  - now back in NSR s/p 6/12/12 adenosine and 10 IVP Cardizem at [place]  - f/u EP recs re: possible ablation  - f/u TTE  - f/u re: need for cath    #Resp:   - no issues    #GI:   - no issues  - c/w DASH diet    #ID:  - no issues    #Metabolic/Renal  - no issues    #Heme  - patient with history of hemochromatosis, stable  - continue to monitor    #Endo:   - f/u A1c    #DVT ppx: s/p 80mg SQ Lovenox at OSH, holding further AC pending decision for possible procedure

## 2017-10-26 NOTE — CHART NOTE - NSCHARTNOTEFT_GEN_A_CORE
====================  CCU MIDNIGHT ROUNDS  ====================    TERA PABLO  [id number]  Patient is a 62y old  Male who presents with a chief complaint of SVT (25 Oct 2017 23:47)      ====================  SUMMARY:  ====================  61 yo M w/PMH of hemochromatosis treated with phlebotomy 3 x per year, chronic fatigue syndrome and HTN who presented to [facility] ED c/o CP radiating to his back associated with palpitations, SOB and dizziness found to have SVT at 179bpm with ST depressions throughout and elevations in AvR transferred to [facility] for ischemic work up      ====================  NEW EVENTS:  ====================  No acute events since transfer to [facility]. Patient asymptomatic at this time. Papo negative x 2. Remains in NSR.      ====================  VITALS (Last 12 hrs):  ====================    T(C): 36.7 (10-25-17 @ 23:18), Max: 36.7 (10-25-17 @ 18:50)  T(F): 98.1 (10-25-17 @ 23:18), Max: 98.1 (10-25-17 @ 23:18)  HR: 62 (10-25-17 @ 23:30) (62 - 184)  BP: 119/80 (10-25-17 @ 23:30) (119/80 - 127/80)  BP(mean): 92 (10-25-17 @ 23:30) (92 - 94)  ABP: --  ABP(mean): --  RR: 16 (10-25-17 @ 23:30) (12 - 19)  SpO2: 99% (10-25-17 @ 23:30) (99% - 100%)  Wt(kg): --  CVP(mm Hg): --  CVP(cm H2O): --  CO: --  CI: --  PA: --  PA(mean): --  PCWP: --  SVR: --  PVR: --    I&O's Summary      ====================  NEW LABS:  ====================                          15.2   7.3   )-----------( 215      ( 26 Oct 2017 00:17 )             42.9     10-26    138  |  101  |  17  ----------------------------<  96  5.1   |  24  |  1.05    Ca    9.4      26 Oct 2017 00:17  Phos  4.6     10-26  Mg     2.3     10-26    TPro  6.7  /  Alb  4.1  /  TBili  0.7  /  DBili  x   /  AST  32  /  ALT  19  /  AlkPhos  46  10-26    PT/INR - ( 26 Oct 2017 00:17 )   PT: 10.5 sec;   INR: 0.97 ratio         PTT - ( 26 Oct 2017 00:17 )  PTT:33.2 sec  Creatine Kinase, Serum: 86 U/L (10-26-17 @ 00:17)  Troponin T, Serum: 0.04 ng/mL (10-26-17 @ 00:17)    CKMB Units: 2.7 ng/mL (10-26 @ 00:17)  CKMB Units: 0.8 ng/mL (10-25 @ 19:26)      ====================  PLAN:  ====================  - f/u TTE in AM  - f/u need for ischemic work up - possible cath      Gali Wen MD, R2

## 2017-10-27 DIAGNOSIS — E78.2 MIXED HYPERLIPIDEMIA: ICD-10-CM

## 2017-10-27 DIAGNOSIS — I10 ESSENTIAL (PRIMARY) HYPERTENSION: ICD-10-CM

## 2017-10-27 DIAGNOSIS — E83.119 HEMOCHROMATOSIS, UNSPECIFIED: ICD-10-CM

## 2017-10-27 LAB
ALBUMIN SERPL ELPH-MCNC: 3.7 G/DL — SIGNIFICANT CHANGE UP (ref 3.3–5)
ALP SERPL-CCNC: 41 U/L — SIGNIFICANT CHANGE UP (ref 40–120)
ALT FLD-CCNC: 19 U/L — SIGNIFICANT CHANGE UP (ref 10–45)
ANION GAP SERPL CALC-SCNC: 13 MMOL/L — SIGNIFICANT CHANGE UP (ref 5–17)
APTT BLD: 27.4 SEC — LOW (ref 27.5–37.4)
AST SERPL-CCNC: 30 U/L — SIGNIFICANT CHANGE UP (ref 10–40)
BILIRUB SERPL-MCNC: 0.7 MG/DL — SIGNIFICANT CHANGE UP (ref 0.2–1.2)
BUN SERPL-MCNC: 15 MG/DL — SIGNIFICANT CHANGE UP (ref 7–23)
CALCIUM SERPL-MCNC: 9 MG/DL — SIGNIFICANT CHANGE UP (ref 8.4–10.5)
CHLORIDE SERPL-SCNC: 105 MMOL/L — SIGNIFICANT CHANGE UP (ref 96–108)
CO2 SERPL-SCNC: 23 MMOL/L — SIGNIFICANT CHANGE UP (ref 22–31)
CREAT SERPL-MCNC: 1.01 MG/DL — SIGNIFICANT CHANGE UP (ref 0.5–1.3)
GLUCOSE SERPL-MCNC: 95 MG/DL — SIGNIFICANT CHANGE UP (ref 70–99)
HCT VFR BLD CALC: 44.8 % — SIGNIFICANT CHANGE UP (ref 39–50)
HGB BLD-MCNC: 15.6 G/DL — SIGNIFICANT CHANGE UP (ref 13–17)
INR BLD: 0.9 RATIO — SIGNIFICANT CHANGE UP (ref 0.88–1.16)
MAGNESIUM SERPL-MCNC: 2.4 MG/DL — SIGNIFICANT CHANGE UP (ref 1.6–2.6)
MCHC RBC-ENTMCNC: 34.8 GM/DL — SIGNIFICANT CHANGE UP (ref 32–36)
MCHC RBC-ENTMCNC: 36.8 PG — HIGH (ref 27–34)
MCV RBC AUTO: 105.7 FL — HIGH (ref 80–100)
PHOSPHATE SERPL-MCNC: 3.9 MG/DL — SIGNIFICANT CHANGE UP (ref 2.5–4.5)
PLATELET # BLD AUTO: 201 K/UL — SIGNIFICANT CHANGE UP (ref 150–400)
POTASSIUM SERPL-MCNC: 4.6 MMOL/L — SIGNIFICANT CHANGE UP (ref 3.5–5.3)
POTASSIUM SERPL-SCNC: 4.6 MMOL/L — SIGNIFICANT CHANGE UP (ref 3.5–5.3)
PROT SERPL-MCNC: 6.7 G/DL — SIGNIFICANT CHANGE UP (ref 6–8.3)
PROTHROM AB SERPL-ACNC: 10.1 SEC — SIGNIFICANT CHANGE UP (ref 10–13.1)
RBC # BLD: 4.24 M/UL — SIGNIFICANT CHANGE UP (ref 4.2–5.8)
RBC # FLD: 13.1 % — SIGNIFICANT CHANGE UP (ref 10.3–14.5)
SODIUM SERPL-SCNC: 141 MMOL/L — SIGNIFICANT CHANGE UP (ref 135–145)
WBC # BLD: 4.72 K/UL — SIGNIFICANT CHANGE UP (ref 3.8–10.5)
WBC # FLD AUTO: 4.72 K/UL — SIGNIFICANT CHANGE UP (ref 3.8–10.5)

## 2017-10-27 PROCEDURE — 93653 COMPRE EP EVAL TX SVT: CPT

## 2017-10-27 PROCEDURE — 99233 SBSQ HOSP IP/OBS HIGH 50: CPT

## 2017-10-27 PROCEDURE — 93623 PRGRMD STIMJ&PACG IV RX NFS: CPT | Mod: 26

## 2017-10-27 PROCEDURE — 93621 COMP EP EVL L PAC&REC C SINS: CPT | Mod: 26

## 2017-10-27 RX ORDER — ATORVASTATIN CALCIUM 80 MG/1
40 TABLET, FILM COATED ORAL AT BEDTIME
Qty: 0 | Refills: 0 | Status: DISCONTINUED | OUTPATIENT
Start: 2017-10-27 | End: 2017-10-28

## 2017-10-27 RX ADMIN — Medication 12.5 MILLIGRAM(S): at 18:41

## 2017-10-27 RX ADMIN — Medication 12.5 MILLIGRAM(S): at 05:52

## 2017-10-27 RX ADMIN — ATORVASTATIN CALCIUM 40 MILLIGRAM(S): 80 TABLET, FILM COATED ORAL at 22:15

## 2017-10-27 NOTE — PROGRESS NOTE ADULT - ASSESSMENT
61 yo M w/PMH of hemochromatosis treated with phlebotomy 3 x per year, chronic fatigue syndrome, HTN, cigar smoker who presented to [facility] ED c/o CP radiating to his back associated with palpitations/SOB/dizziness found to have SVT at 179bpm transferred to [facility] for ischemic work up S/P LHC nonobstructive, now pending AVNRT ablation per EP.

## 2017-10-27 NOTE — CHART NOTE - NSCHARTNOTEFT_GEN_A_CORE
MAR ACCEPT NOTE    62-year-old male with history of hemochromatosis who originally presented to [facility] with chest pain and dizziness in the setting of SVT. The symptoms resolved after multiple pushes of adenosine and diltiazem converted the patient back to sinus rhythm. EKG showed some ST depressions so he was transferred to [facility] for ischemic evaluation. Left heart cath showed non-obstructive CAD. The patient was then transferred to the floor. On exam the patient is resting comfortably and in no acute distress. He is in NSR on tele. Plan for EP study on Fri 10/27.    Carlos Enrique Garza  [phone]

## 2017-10-27 NOTE — PROGRESS NOTE ADULT - PROBLEM SELECTOR PLAN 4
ascvd 10 yr risk 14% given smoker,  HDL 93 ldl 128  - start lipitor 40 mg po bedtime (high intensity statin)

## 2017-10-27 NOTE — PROGRESS NOTE ADULT - PROBLEM SELECTOR PLAN 1
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 s/p [facility] 10/26 nonobstructive and TTE WNL.  - tele - NSR c/w monitor  - f/u EP - for ablation today  - monitor electrolytes

## 2017-10-27 NOTE — PROGRESS NOTE ADULT - PROBLEM SELECTOR PLAN 5
dvt ppx: ambulating  No PT/OT needs  Dispo: d/c likely tomorrow 10/28 s/p EP procedure and monitoring of HR on telemetry

## 2017-10-27 NOTE — PROGRESS NOTE ADULT - ASSESSMENT
61 yo M w/PMH of hemochromatosis treated with phlebotomy 3 x per year, chronic fatigue syndrome and HTN who presented to [facility] ED c/o CP radiating to his back associated with palpitations, SOB and dizziness found to have SVT at 180bpm likely AVNRT that is adenosine/diltiazem responsive.    -- AVNRT ablation today    Charli Nichols MD

## 2017-10-27 NOTE — CHART NOTE - NSCHARTNOTEFT_GEN_A_CORE
Called to bedside by RN and NP for concern for R groin hematoma/swelling. R groin access site w gauze/tegaderm dressing. Gauze C/D/I without e/o bleeding, hematoma, induration. Surrounding skin is soft.     No hematoma. Educated RN and patient. Dressing to be removed in AM.    Caprice Dexter [facility] HospitalCATALINA, DNP  [phone]

## 2017-10-28 ENCOUNTER — TRANSCRIPTION ENCOUNTER (OUTPATIENT)
Age: 62
End: 2017-10-28

## 2017-10-28 VITALS
TEMPERATURE: 98 F | DIASTOLIC BLOOD PRESSURE: 80 MMHG | RESPIRATION RATE: 19 BRPM | SYSTOLIC BLOOD PRESSURE: 148 MMHG | HEART RATE: 77 BPM | OXYGEN SATURATION: 98 %

## 2017-10-28 DIAGNOSIS — R19.5 OTHER FECAL ABNORMALITIES: ICD-10-CM

## 2017-10-28 PROBLEM — Z00.00 ENCOUNTER FOR PREVENTIVE HEALTH EXAMINATION: Status: ACTIVE | Noted: 2017-10-28

## 2017-10-28 LAB
ANION GAP SERPL CALC-SCNC: 16 MMOL/L — SIGNIFICANT CHANGE UP (ref 5–17)
BUN SERPL-MCNC: 13 MG/DL — SIGNIFICANT CHANGE UP (ref 7–23)
CALCIUM SERPL-MCNC: 9.3 MG/DL — SIGNIFICANT CHANGE UP (ref 8.4–10.5)
CHLORIDE SERPL-SCNC: 103 MMOL/L — SIGNIFICANT CHANGE UP (ref 96–108)
CO2 SERPL-SCNC: 23 MMOL/L — SIGNIFICANT CHANGE UP (ref 22–31)
CREAT SERPL-MCNC: 0.91 MG/DL — SIGNIFICANT CHANGE UP (ref 0.5–1.3)
GLUCOSE SERPL-MCNC: 86 MG/DL — SIGNIFICANT CHANGE UP (ref 70–99)
POTASSIUM SERPL-MCNC: 4.7 MMOL/L — SIGNIFICANT CHANGE UP (ref 3.5–5.3)
POTASSIUM SERPL-SCNC: 4.7 MMOL/L — SIGNIFICANT CHANGE UP (ref 3.5–5.3)
SODIUM SERPL-SCNC: 142 MMOL/L — SIGNIFICANT CHANGE UP (ref 135–145)

## 2017-10-28 PROCEDURE — C1893: CPT

## 2017-10-28 PROCEDURE — 93653 COMPRE EP EVAL TX SVT: CPT

## 2017-10-28 PROCEDURE — 99239 HOSP IP/OBS DSCHRG MGMT >30: CPT

## 2017-10-28 PROCEDURE — C1769: CPT

## 2017-10-28 PROCEDURE — 93613 INTRACARDIAC EPHYS 3D MAPG: CPT

## 2017-10-28 PROCEDURE — 80061 LIPID PANEL: CPT

## 2017-10-28 PROCEDURE — 93005 ELECTROCARDIOGRAM TRACING: CPT

## 2017-10-28 PROCEDURE — 83735 ASSAY OF MAGNESIUM: CPT

## 2017-10-28 PROCEDURE — 86850 RBC ANTIBODY SCREEN: CPT

## 2017-10-28 PROCEDURE — 84443 ASSAY THYROID STIM HORMONE: CPT

## 2017-10-28 PROCEDURE — 83880 ASSAY OF NATRIURETIC PEPTIDE: CPT

## 2017-10-28 PROCEDURE — C1730: CPT

## 2017-10-28 PROCEDURE — 80053 COMPREHEN METABOLIC PANEL: CPT

## 2017-10-28 PROCEDURE — 99285 EMERGENCY DEPT VISIT HI MDM: CPT

## 2017-10-28 PROCEDURE — 93010 ELECTROCARDIOGRAM REPORT: CPT

## 2017-10-28 PROCEDURE — 85610 PROTHROMBIN TIME: CPT

## 2017-10-28 PROCEDURE — 96374 THER/PROPH/DIAG INJ IV PUSH: CPT

## 2017-10-28 PROCEDURE — 83036 HEMOGLOBIN GLYCOSYLATED A1C: CPT

## 2017-10-28 PROCEDURE — 93306 TTE W/DOPPLER COMPLETE: CPT

## 2017-10-28 PROCEDURE — 96372 THER/PROPH/DIAG INJ SC/IM: CPT

## 2017-10-28 PROCEDURE — 93623 PRGRMD STIMJ&PACG IV RX NFS: CPT

## 2017-10-28 PROCEDURE — 84100 ASSAY OF PHOSPHORUS: CPT

## 2017-10-28 PROCEDURE — 85379 FIBRIN DEGRADATION QUANT: CPT

## 2017-10-28 PROCEDURE — 86901 BLOOD TYPING SEROLOGIC RH(D): CPT

## 2017-10-28 PROCEDURE — C1894: CPT

## 2017-10-28 PROCEDURE — 99152 MOD SED SAME PHYS/QHP 5/>YRS: CPT

## 2017-10-28 PROCEDURE — 93454 CORONARY ARTERY ANGIO S&I: CPT

## 2017-10-28 PROCEDURE — 82553 CREATINE MB FRACTION: CPT

## 2017-10-28 PROCEDURE — 80048 BASIC METABOLIC PNL TOTAL CA: CPT

## 2017-10-28 PROCEDURE — 86900 BLOOD TYPING SEROLOGIC ABO: CPT

## 2017-10-28 PROCEDURE — 82550 ASSAY OF CK (CPK): CPT

## 2017-10-28 PROCEDURE — C1887: CPT

## 2017-10-28 PROCEDURE — 85027 COMPLETE CBC AUTOMATED: CPT

## 2017-10-28 PROCEDURE — 36415 COLL VENOUS BLD VENIPUNCTURE: CPT

## 2017-10-28 PROCEDURE — 85730 THROMBOPLASTIN TIME PARTIAL: CPT

## 2017-10-28 PROCEDURE — 84484 ASSAY OF TROPONIN QUANT: CPT

## 2017-10-28 PROCEDURE — 71045 X-RAY EXAM CHEST 1 VIEW: CPT

## 2017-10-28 RX ORDER — ATORVASTATIN CALCIUM 80 MG/1
1 TABLET, FILM COATED ORAL
Qty: 30 | Refills: 0 | OUTPATIENT
Start: 2017-10-28 | End: 2017-11-27

## 2017-10-28 RX ORDER — METOPROLOL TARTRATE 50 MG
0.5 TABLET ORAL
Qty: 15 | Refills: 0 | OUTPATIENT
Start: 2017-10-28 | End: 2017-11-27

## 2017-10-28 RX ORDER — LOPERAMIDE HCL 2 MG
1 TABLET ORAL
Qty: 18 | Refills: 0 | OUTPATIENT
Start: 2017-10-28 | End: 2017-10-31

## 2017-10-28 RX ORDER — LOPERAMIDE HCL 2 MG
2 TABLET ORAL EVERY 4 HOURS
Qty: 0 | Refills: 0 | Status: DISCONTINUED | OUTPATIENT
Start: 2017-10-28 | End: 2017-10-28

## 2017-10-28 RX ORDER — LOSARTAN POTASSIUM 100 MG/1
1 TABLET, FILM COATED ORAL
Qty: 0 | Refills: 0 | COMMUNITY

## 2017-10-28 RX ADMIN — Medication 12.5 MILLIGRAM(S): at 05:08

## 2017-10-28 NOTE — PROGRESS NOTE ADULT - ASSESSMENT
63 yo M w/PMH of hemochromatosis treated with phlebotomy 3 x per year, chronic fatigue syndrome, HTN, cigar smoker who presented to [facility] ED c/o CP radiating to his back associated with palpitations/SOB/dizziness found to have SVT at 179bpm transferred to [facility] for ischemic work up S/P LHC nonobstructive, now s/p EP ablation 10/27.

## 2017-10-28 NOTE — DISCHARGE NOTE ADULT - PATIENT PORTAL LINK FT
“You can access the [facility] Patient Portal, offered by [facility], by registering with the following website: [email or web address]”

## 2017-10-28 NOTE — DISCHARGE NOTE ADULT - HOSPITAL COURSE
61 yo M w/PMH of hemochromatosis treated with phlebotomy 3 x per year, chronic fatigue syndrome and HTN who presented to [facility] ED c/o CP radiating to his back associated with palpitations, SOB and dizziness found to have SVT at 179bpm with ST depressions throughout and elevations in AvR transferred to [facility] for ischemic work up    Dx: SVT  s/p Echo:  Normal left atrium.  Grossly normal left ventricular internal dimensions and wall thicknesses.  Endocardium not well visualized; grossly normal left ventricular systolic function.  10/27:  Supraventricular tachycardia. s/p EP study AVNRT ablation. D/C to home, pt has flight to [place] today, follow up with PCP, Dr. Dee there next week. 63 yo M w/PMH of hemochromatosis treated with phlebotomy 3 x per year, chronic fatigue syndrome and HTN who presented to [facility] ED c/o CP radiating to his back associated with palpitations, SOB and dizziness found to have SVT at 179bpm with ST depressions throughout and elevations in AvR transferred to [facility] for ischemic work up. 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 s/p LHC 10/26 nonobstructive and TTE WNL. S/P EP ablation 10/27 without recurrence of symptoms and pt noted in sinus rhythm. TSH wnl. Pt noted to complain of multiple ep of loose BMs 10/28 without abd pain and recommended pt to follow up closely w/ PCP and consider stool studies if persistent. Prescribed short course of loperamide but advised pt to report to his doctor or ED if he experiences severe abd pain, n/v. Pt's labs were remarkable for elevated cholesterol, ASCVD 10 yr risk 14% and was started on lipitor 40 mg bedtime. He was hemodynamically stable and clinically well appearing for  D/C to home, follow up with PCP, Dr. Dee there next week.

## 2017-10-28 NOTE — PROGRESS NOTE ADULT - ATTENDING COMMENTS
Patient with short RP tachycardia, likely AVNRT.  [facility] today.  EP evaluation pending.
Amber Contreras MD  #[phone]
Amber Contreras MD  #[phone]

## 2017-10-28 NOTE — PROGRESS NOTE ADULT - PROBLEM SELECTOR PLAN 1
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 s/p LHC 10/26 nonobstructive and TTE WNL. S/P EP ablation 10/27 without recurrence of symptoms.  - tele - sinus rhythm  - per EP ok for d/c today and to continue current lose dose BB  - monitor electrolytes

## 2017-10-28 NOTE — DISCHARGE NOTE ADULT - CARE PLAN
Principal Discharge DX:	Supraventricular tachycardia  Goal:	Remain without palpitations  Instructions for follow-up, activity and diet:	You may have symptoms such as dizziness, chest pain, or fainting (syncope) that are caused by your fast heart rate.  Take your medication as prescribed.  If episodes of supraventricular tachycardia (SVT) start suddenly and cause symptoms, you can try vagal maneuvers—such as holding your breath and bearing down (Valsalva maneuver), or coughing. These simple maneuvers stimulate the vagus nerve, which can slow conduction of electrical impulses that control your heart rate. Your doctor can teach you how to do vagal maneuvers safely.  Notify your doctor or go to the nearest emergency room if your heart rate doesn't slow and symptoms persist.  Secondary Diagnosis:	Mixed hyperlipidemia  Instructions for follow-up, activity and diet:	Low salt, low fat, low cholesterol, diabetic diet if appropriate  Continue medication as prescribed  Exercise, increase your activity level  Secondary Diagnosis:	Essential hypertension  Instructions for follow-up, activity and diet:	Low salt diet  Activity as tolerated.  Take all medication as prescribed.  Follow up with your medical doctor for routine blood pressure monitoring at your next visit.  Notify your doctor if you have any of the following symptoms:   Dizziness, Lightheadedness, Blurry vision, Headache, Chest pain, Shortness of breath Principal Discharge DX:	Supraventricular tachycardia  Goal:	Remain without palpitations  Instructions for follow-up, activity and diet:	You may have symptoms such as dizziness, chest pain, or fainting (syncope) that are caused by your fast heart rate.  Take your medication as prescribed.  If episodes of supraventricular tachycardia (SVT) start suddenly and cause symptoms, you can try vagal maneuvers—such as holding your breath and bearing down (Valsalva maneuver), or coughing. These simple maneuvers stimulate the vagus nerve, which can slow conduction of electrical impulses that control your heart rate. Your doctor can teach you how to do vagal maneuvers safely.  Notify your doctor or go to the nearest emergency room if your heart rate doesn't slow and symptoms persist.  Secondary Diagnosis:	Mixed hyperlipidemia  Instructions for follow-up, activity and diet:	Low salt, low fat, low cholesterol, diabetic diet if appropriate  Continue medication as prescribed  Exercise, increase your activity level  Secondary Diagnosis:	Essential hypertension  Instructions for follow-up, activity and diet:	Low salt diet  Activity as tolerated.  Take all medication as prescribed.  Follow up with your medical doctor for routine blood pressure monitoring at your next visit.  Notify your doctor if you have any of the following symptoms:   Dizziness, Lightheadedness, Blurry vision, Headache, Chest pain, Shortness of breath  Secondary Diagnosis:	Loose stools  Instructions for follow-up, activity and diet:	Loperamide for 3 days as needed  Follow up with your PCP for stool studies Principal Discharge DX:	Supraventricular tachycardia  Goal:	Remain without palpitations  Instructions for follow-up, activity and diet:	You had AVNRT heart rhythm and received medications which helped. You had an EP ablation on 10/27 and tolerated it well. Continue to take your metoprolol as prescribed.  You may have symptoms such as dizziness, chest pain, or fainting (syncope) that are caused by your fast heart rate.  Take your medication as prescribed.  If episodes of supraventricular tachycardia (SVT) start suddenly and cause symptoms, you can try vagal maneuvers—such as holding your breath and bearing down (Valsalva maneuver), or coughing. These simple maneuvers stimulate the vagus nerve, which can slow conduction of electrical impulses that control your heart rate. Your doctor can teach you how to do vagal maneuvers safely.  Notify your doctor or go to the nearest emergency room if your heart rate doesn't slow and symptoms persist.  Secondary Diagnosis:	Mixed hyperlipidemia  Instructions for follow-up, activity and diet:	Low salt, low fat, low cholesterol, diabetic diet if appropriate  Continue medication as prescribed  Exercise, increase your activity level  Cut down on smoking cigards  Repeat your cholesterol levels with your PCP  Secondary Diagnosis:	Essential hypertension  Instructions for follow-up, activity and diet:	Hold off on losartan and Hydrochlorothiazide. Follow up your BP with your PCP.  Low salt diet  Activity as tolerated.  Follow up with your medical doctor for routine blood pressure monitoring at your next visit.  Notify your doctor if you have any of the following symptoms:   Dizziness, Lightheadedness, Blurry vision, Headache, Chest pain, Shortness of breath  Secondary Diagnosis:	Loose stools  Instructions for follow-up, activity and diet:	Loperamide for 3 days as needed  Follow up with your PCP for stool studies if your symptoms are persistent.  If you experience worsening diarrhea, n/v, abd cramping, report it to your doctor and go to the ED.

## 2017-10-28 NOTE — PROGRESS NOTE ADULT - SUBJECTIVE AND OBJECTIVE BOX
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    Review Of Systems:  Constitutional: [ ] Fever [ ] Chills [ ] Fatigue [ ] Weight change   HEENT: [ ] Blurred vision [ ] Eye Pain [ ] Headache [ ] Runny nose [ ] Sore Throat   Respiratory: [ ] Cough [ ] Wheezing [ ] Shortness of breath  Cardiovascular: [ ] Chest Pain [ ] Palpitations [ ] [place] [ ] PND [ ] Orthopnea  Gastrointestinal: [ ] Abdominal Pain [ ] Diarrhea [ ] Constipation [ ] Hemorrhoids [ ] Nausea [ ] Vomiting  Genitourinary: [ ] Nocturia [ ] Dysuria [ ] Incontinence  Extremities: [ ] Swelling [ ] Joint Pain  Neurologic: [ ] Focal deficit [ ] Paresthesias [ ] Syncope  Lymphatic: [ ] Swelling [ ] Lymphadenopathy   Skin: [ ] Rash [ ] Ecchymoses [ ] Wounds [ ] Lesions  Psychiatry: [ ] Depression [ ] Suicidal/Homicidal Ideation [ ] Anxiety [ ] Sleep Disturbances  [ ] 10 point review of systems is otherwise negative except as mentioned above            [ ]Unable to obtain    Medications:  metoprolol     tartrate 12.5 milliGRAM(s) Oral two times a day    PMH/PSH/FH/SH: [ ] Unchanged  Vitals:  T(C): 36.4 (10-27-17 @ 04:35), Max: 36.7 (10-26-17 @ 15:00)  HR: 65 (10-27-17 @ 04:35) (56 - 75)  BP: 118/78 (10-27-17 @ 04:35) (102/71 - 131/77)  BP(mean): 80 (10-26-17 @ 19:00) (78 - 97)  RR: 18 (10-27-17 @ 04:35) (13 - 33)  SpO2: 98% (10-27-17 @ 04:35) (90% - 98%)  Wt(kg): --  Daily Height in cm: 187.96 (26 Oct 2017 21:45)    Daily   I&O's Summary    26 Oct 2017 07:01  -  27 Oct 2017 07:00  --------------------------------------------------------  IN: 620 mL / OUT: 1150 mL / NET: -530 mL    Physical Exam:  Appearance:  Normal, NAD  Eyes: PERRL, EOMI  HENT: Normal oral [place] NC/AT  Cardiovascular: S1, S2, RRR, No m/r/g appreciated, No edema, no elevation in JVP  Respiratory: Clear to auscultation bilaterally  Gastrointestinal: Soft, Non-tender, Non-distended, BS+  Musculoskeletal:  No clubbing, No joint deformity   Neurologic: Non-focal  Lymphatic: No lymphadenopathy  Psychiatry: AAOx3, Mood & affect appropriate  Skin: No rashes, No ecchymoses, No cyanosis    Labs:                        15.6   4.72  )-----------( 201      ( 27 Oct 2017 07:26 )             44.8     10-27    141  |  105  |  15  ----------------------------<  95  4.6   |  23  |  1.01    Ca    9.0      27 Oct 2017 07:32  Phos  3.9     10-27  Mg     2.4     10-27    TPro  6.7  /  Alb  3.7  /  TBili  0.7  /  DBili  x   /  AST  30  /  ALT  19  /  AlkPhos  41  10-27    PT/INR - ( 27 Oct 2017 07:26 )   PT: 10.1 sec;   INR: 0.90 ratio      PTT - ( 27 Oct 2017 07:26 )  PTT:27.4 sec  CARDIAC MARKERS ( 26 Oct 2017 06:23 )  x     / <0.01 ng/mL / 77 U/L / x     / 2.7 ng/mL  CARDIAC MARKERS ( 26 Oct 2017 00:17 )  x     / 0.04 ng/mL / 86 U/L / x     / 2.7 ng/mL  CARDIAC MARKERS ( 25 Oct 2017 19:26 )  .000 ng/mL / x     / x     / x     / 0.8 ng/mL      Serum Pro-Brain Natriuretic Peptide: 121 pg/mL (10-25 @ 19:26)
[facility] Progress Note  10/28 Coverage [id number], otherwise [id number]    Interval Events:  No events. Feels well. Tele sinus without recurrent of SVT.    Review of Systems:  Constitutional: [ -] Fever [- ] Chills [ ] Fatigue [ ] Weight change   HEENT: [ ] Blurred vision [ ] Eye Pain [- ] Headache [ ] Runny nose [ ] Sore Throat   Respiratory: [- ] Cough [ ] Wheezing [ -] Shortness of breath  Cardiovascular: [- ] Chest Pain -[ ] Palpitations [ -] [place] [ ] PND [ ] Orthopnea  Gastrointestinal: [- ] Abdominal Pain [ ] Diarrhea [ ] Constipation [ ] Hemorrhoids [ ] Nausea [ ] Vomiting  Genitourinary: [ ] Nocturia [- ] Dysuria [ ] Incontinence  Extremities: [ -] Swelling [ ] Joint Pain  Neurologic: [ -] Focal deficit [ ] Paresthesias [- ] Syncope  Skin: [- ] Rash [ ] Ecchymoses [ ] Wounds [ ] Lesions  Psychiatry: [ -] Depression [ ] Suicidal/Homicidal Ideation [ ] Anxiety [ ] Sleep Disturbances  [ ] 10 point review of systems is otherwise negative except as mentioned above            [ ]Unable to obtain      MEDICATIONS:  metoprolol     tartrate 12.5 milliGRAM(s) Oral two times a day  loperamide 2 milliGRAM(s) Oral every 4 hours PRN  atorvastatin 40 milliGRAM(s) Oral at bedtime    PHYSICAL EXAM:  T(C): 36.6 (10-28-17 @ 05:00), Max: 36.9 (10-27-17 @ 13:26)  HR: 59 (10-28-17 @ 05:00) (59 - 80)  BP: 107/73 (10-28-17 @ 05:00) (107/73 - 134/83)  RR: 18 (10-28-17 @ 05:00) (18 - 18)  SpO2: 97% (10-28-17 @ 05:00) (95% - 99%)  Wt(kg): --  I&O's Summary    27 Oct 2017 07:01  -  28 Oct 2017 07:00  --------------------------------------------------------  IN: 240 mL / OUT: 200 mL / NET: 40 mL    28 Oct 2017 07:01  -  28 Oct 2017 11:41  --------------------------------------------------------  IN: 360 mL / OUT: 0 mL / NET: 360 mL      Daily     Daily Weight in k.7 (28 Oct 2017 08:59)    Appearance: Normal	  HEENT:   Normal oral mucosa	  Cardiovascular: Normal S1 S2, No JVD, No murmurs, No edema  Respiratory: Lungs clear to auscultation	  Psychiatry: A & O x 3, Mood & affect appropriate  Gastrointestinal:  soft nt nd  Skin: No rashes, No ecchymoses, No cyanosis	  Neurologic: Non-focal  Extremities: Normal range of motion, No clubbing, cyanosis  Vascular: Peripheral pulses palpable 2+ bilaterally. bilateral groin sites c/d/i without hematoma. L groin with small area of ecchymosis. RRA access site no hematoma, +ecchymosis with 2+ distal radial pulse and brisk cap refill    LABS:	 	    CBC Full  -  ( 27 Oct 2017 07:26 )  WBC Count : 4.72 K/uL  Hemoglobin : 15.6 g/dL  Hematocrit : 44.8 %  Platelet Count - Automated : 201 K/uL  Mean Cell Volume : 105.7 fl  Mean Cell Hemoglobin : 36.8 pg  Mean Cell Hemoglobin Concentration : 34.8 gm/dL  Auto Neutrophil # : x  Auto Lymphocyte # : x  Auto Monocyte # : x  Auto Eosinophil # : x  Auto Basophil # : x  Auto Neutrophil % : x  Auto Lymphocyte % : x  Auto Monocyte % : x  Auto Eosinophil % : x  Auto Basophil % : x    10-28    142  |  103  |  13  ----------------------------<  86  4.7   |  23  |  0.91  10-27    141  |  105  |  15  ----------------------------<  95  4.6   |  23  |  1.01    Ca    9.3      28 Oct 2017 08:24  Ca    9.0      27 Oct 2017 07:32  Phos  3.9     10-27  Mg     2.4     10-27    TPro  6.7  /  Alb  3.7  /  TBili  0.7  /  DBili  x   /  AST  30  /  ALT  19  /  AlkPhos  41  10-27    TELEMETRY: 	sinus rhythm/herminio
Patient is a 62y old  Male who presents with a chief complaint of SVT (25 Oct 2017 23:47)        SUBJECTIVE / OVERNIGHT EVENTS:      CAPILLARY BLOOD GLUCOSE        I&O's Summary    26 Oct 2017 07:01  -  27 Oct 2017 07:00  --------------------------------------------------------  IN: 620 mL / OUT: 1150 mL / NET: -530 mL        Vital Signs Last 24 Hrs  T(C): 36.6 (27 Oct 2017 11:25), Max: 36.7 (26 Oct 2017 15:00)  T(F): 97.8 (27 Oct 2017 11:25), Max: 98.1 (26 Oct 2017 21:45)  HR: 59 (27 Oct 2017 11:25) (56 - 75)  BP: 143/69 (27 Oct 2017 11:25) (102/71 - 143/69)  BP(mean): 80 (26 Oct 2017 19:00) (80 - 97)  RR: 18 (27 Oct 2017 11:25) (13 - 33)  SpO2: 98% (27 Oct 2017 11:25) (90% - 98%)    PHYSICAL EXAM:  GENERAL:  Well appearing, in NAD  HEAD:  NCAT  EYES: PERRLA, conjunctiva clear  NECK: Supple, No JVD  CHEST/LUNG: CTA B/L. No w/r/r.  HEART: RRR. Normal S1, S2. No m/r/g.   ABDOMEN: SNTND. Bowel sounds present  EXTREMITIES:  2+ Peripheral Pulses, No clubbing, cyanosis, edema.  PSYCH: AAOx3, appropriate affect  NEUROLOGY: grossly non-focal  SKIN: No rashes or lesions    LABS:                        15.6   4.72  )-----------( 201      ( 27 Oct 2017 07:26 )             44.8     10-27    141  |  105  |  15  ----------------------------<  95  4.6   |  23  |  1.01    Ca    9.0      27 Oct 2017 07:32  Phos  3.9     10-27  Mg     2.4     10-27    TPro  6.7  /  Alb  3.7  /  TBili  0.7  /  DBili  x   /  AST  30  /  ALT  19  /  AlkPhos  41  10-27    PT/INR - ( 27 Oct 2017 07:26 )   PT: 10.1 sec;   INR: 0.90 ratio         PTT - ( 27 Oct 2017 07:26 )  PTT:27.4 sec  CARDIAC MARKERS ( 26 Oct 2017 06:23 )  x     / <0.01 ng/mL / 77 U/L / x     / 2.7 ng/mL  CARDIAC MARKERS ( 26 Oct 2017 00:17 )  x     / 0.04 ng/mL / 86 U/L / x     / 2.7 ng/mL  CARDIAC MARKERS ( 25 Oct 2017 19:26 )  .000 ng/mL / x     / x     / x     / 0.8 ng/mL      RADIOLOGY & ADDITIONAL TESTS:  Telemetry reviewed: sinus 50-70  Imaging reviewed - LHC nonobstructive, TTE WNL  Consultant(s) Notes Reviewed:  EP  Care Discussed with Consultants/Other Providers: Floor NP    MEDICATIONS  (STANDING):  metoprolol     tartrate 12.5 milliGRAM(s) Oral two times a day    MEDICATIONS  (PRN):
Patient is a 62y old  Male who presents with a chief complaint of SVT (28 Oct 2017 09:40)        SUBJECTIVE / OVERNIGHT EVENTS:  Pt received ablation yesterday without complications. Seen this AM, anxious to go home - has flight today at 4pm back to home. Denies palpitations, cp, n/v, dizziness, sob, LE edema. Reports multiple small episodes of loose stools up to 10 episodes since last night, now slowing down without abd pain.     I&O's Summary    27 Oct 2017 07:01  -  28 Oct 2017 07:00  --------------------------------------------------------  IN: 240 mL / OUT: 200 mL / NET: 40 mL    28 Oct 2017 07:01  -  28 Oct 2017 12:37  --------------------------------------------------------  IN: 360 mL / OUT: 0 mL / NET: 360 mL    Vital Signs Last 24 Hrs  T(C): 36.8 (28 Oct 2017 11:57), Max: 36.9 (27 Oct 2017 13:26)  T(F): 98.2 (28 Oct 2017 11:57), Max: 98.5 (27 Oct 2017 13:26)  HR: 73 (28 Oct 2017 11:57) (59 - 80)  BP: 107/73 (28 Oct 2017 05:00) (107/73 - 134/83)  BP(mean): --  RR: 18 (28 Oct 2017 11:57) (18 - 18)  SpO2: 98% (28 Oct 2017 11:57) (95% - 99%)    PHYSICAL EXAM:  GENERAL:  Well appearing, in NAD  HEAD:  NCAT  EYES: PERRLA, conjunctiva clear  NECK: Supple, No JVD  CHEST/LUNG: CTA B/L. No w/r/r.  HEART: RRR. Normal S1, S2. No m/r/g.   ABDOMEN: SNTND, no rebound or guarding. Bowel sounds present  EXTREMITIES:  2+ Peripheral Pulses, No clubbing, cyanosis, edema.  PSYCH: AAOx3, appropriate affect  NEUROLOGY: grossly non-focal  SKIN: No rashes or lesions    LABS:                        15.6   4.72  )-----------( 201      ( 27 Oct 2017 07:26 )             44.8     10-28    142  |  103  |  13  ----------------------------<  86  4.7   |  23  |  0.91    Ca    9.3      28 Oct 2017 08:24  Phos  3.9     10-27  Mg     2.4     10-27    TPro  6.7  /  Alb  3.7  /  TBili  0.7  /  DBili  x   /  AST  30  /  ALT  19  /  AlkPhos  41  10-27    PT/INR - ( 27 Oct 2017 07:26 )   PT: 10.1 sec;   INR: 0.90 ratio         PTT - ( 27 Oct 2017 07:26 )  PTT:27.4 sec    labs reviewed.     RADIOLOGY & ADDITIONAL TESTS:  Telemetry reviewed: sinus 50s-70s.  EKG 10/28 personally reviewed - sinus herminio 57, qtc 385  Consultant(s) Notes Reviewed:  EP  Care Discussed with Consultants/Other Providers: Floor NP, EP    MEDICATIONS  (STANDING):  atorvastatin 40 milliGRAM(s) Oral at bedtime  metoprolol     tartrate 12.5 milliGRAM(s) Oral two times a day    MEDICATIONS  (PRN):  loperamide 2 milliGRAM(s) Oral every 4 hours PRN Diarrhea
CONTACT INFO:  Gali Wen, JORGE  Pager:  [phone]      HPI / INTERVAL HISTORY:    No acute events overnight s/p transfer to [facility]. Asymptomatic, HD stable.    OBJECTIVE:  VITAL SIGNS:  ICU Vital Signs Last 24 Hrs  T(C): 36.7 (25 Oct 2017 23:18), Max: 36.7 (25 Oct 2017 18:50)  T(F): 98.1 (25 Oct 2017 23:18), Max: 98.1 (25 Oct 2017 23:18)  HR: 54 (26 Oct 2017 04:00) (54 - 184)  BP: 97/64 (26 Oct 2017 04:00) (97/64 - 130/90)  BP(mean): 73 (26 Oct 2017 04:00) (70 - 105)  ABP: --  ABP(mean): --  RR: 17 (26 Oct 2017 04:00) (12 - 33)  SpO2: 100% (26 Oct 2017 04:00) (98% - 100%)        10-25 @ 07:01  -  10-26 @ 05:10  --------------------------------------------------------  IN: 120 mL / OUT: 0 mL / NET: 120 mL      CAPILLARY BLOOD GLUCOSE    PHYSICAL EXAM:  Gen: NAD  HEENT: NC/AT; PERRL, anicteric sclera  Neck: supple, no JVD  Resp: clear to ausculation B/L; no wheezes, rales or rhonchi  Cardiovasc: S1S2 normal; RRR; no murmurs, rubs or gallops  GI: soft, nondistended, nontender; +BS  Extr: warm, well-perfused, PT/DP pulses 2+ B/L; no LE edema  Skin: normal color and turgor  Neuro: AAox4    LABS:                        15.2   7.3   )-----------( 215      ( 26 Oct 2017 00:17 )             42.9     10-26    138  |  101  |  17  ----------------------------<  96  5.1   |  24  |  1.05    Ca    9.4      26 Oct 2017 00:17  Phos  4.6     10-26  Mg     2.3     10-26    TPro  6.7  /  Alb  4.1  /  TBili  0.7  /  DBili  x   /  AST  32  /  ALT  19  /  AlkPhos  46  10-26    LIVER FUNCTIONS - ( 26 Oct 2017 00:17 )  Alb: 4.1 g/dL / Pro: 6.7 g/dL / ALK PHOS: 46 U/L / ALT: 19 U/L RC / AST: 32 U/L / GGT: x           PT/INR - ( 26 Oct 2017 00:17 )   PT: 10.5 sec;   INR: 0.97 ratio         PTT - ( 26 Oct 2017 00:17 )  PTT:33.2 sec    CARDIAC MARKERS ( 26 Oct 2017 00:17 )  x     / 0.04 ng/mL / 86 U/L / x     / 2.7 ng/mL  CARDIAC MARKERS ( 25 Oct 2017 19:26 )  .000 ng/mL / x     / x     / x     / 0.8 ng/mL    RADIOLOGY & ADDITIONAL TESTS:       MEDICATIONS:  metoprolol     tartrate 12.5 milliGRAM(s) Oral two times a day    ALLERGIES:  No Known Allergies

## 2017-10-28 NOTE — DISCHARGE NOTE ADULT - MEDICATION SUMMARY - MEDICATIONS TO STOP TAKING
I will STOP taking the medications listed below when I get home from the hospital:    hydroCHLOROthiazide 25 mg oral tablet  -- 1 tab(s) by mouth once a day    losartan 25 mg oral tablet  -- 1 tab(s) by mouth once a day

## 2017-10-28 NOTE — PROGRESS NOTE ADULT - ASSESSMENT
62M hemochromatosis, HTN, who presented with symptomatic AVNRT now s/p diagnostic LHC and AVNRT ablation. No recurrence of symptoms.  -okay for discharge from electrophysiology perspective  -mild sinus bradycardia noted. can continue low dose metoprolol for now.

## 2017-10-28 NOTE — DISCHARGE NOTE ADULT - MEDICATION SUMMARY - MEDICATIONS TO TAKE
I will START or STAY ON the medications listed below when I get home from the hospital:    atorvastatin 40 mg oral tablet  -- 1 tab(s) by mouth once a day (at bedtime)  -- Indication: For Mixed hyperlipidemia    metoprolol tartrate 25 mg oral tablet  -- 0.5 tab(s) by mouth once a day   -- It is very important that you take or use this exactly as directed.  Do not skip doses or discontinue unless directed by your doctor.  May cause drowsiness.  Alcohol may intensify this effect.  Use care when operating dangerous machinery.  Some non-prescription drugs may aggravate your condition.  Read all labels carefully.  If a warning appears, check with your doctor before taking.  Take with food or milk.  This drug may impair the ability to drive or operate machinery.  Use care until you become familiar with its effects.    -- Indication: For Supraventricular tachycardia I will START or STAY ON the medications listed below when I get home from the hospital:    loperamide 2 mg oral capsule  -- 1 cap(s) by mouth every 4 hours, As needed, Diarrhea  -- Indication: For loose stools    atorvastatin 40 mg oral tablet  -- 1 tab(s) by mouth once a day (at bedtime)  -- Indication: For Mixed hyperlipidemia    metoprolol tartrate 25 mg oral tablet  -- 0.5 tab(s) by mouth once a day   -- It is very important that you take or use this exactly as directed.  Do not skip doses or discontinue unless directed by your doctor.  May cause drowsiness.  Alcohol may intensify this effect.  Use care when operating dangerous machinery.  Some non-prescription drugs may aggravate your condition.  Read all labels carefully.  If a warning appears, check with your doctor before taking.  Take with food or milk.  This drug may impair the ability to drive or operate machinery.  Use care until you become familiar with its effects.    -- Indication: For Supraventricular tachycardia

## 2017-10-28 NOTE — DISCHARGE NOTE ADULT - PLAN OF CARE
Remain without palpitations You may have symptoms such as dizziness, chest pain, or fainting (syncope) that are caused by your fast heart rate.  Take your medication as prescribed.  If episodes of supraventricular tachycardia (SVT) start suddenly and cause symptoms, you can try vagal maneuvers—such as holding your breath and bearing down (Valsalva maneuver), or coughing. These simple maneuvers stimulate the vagus nerve, which can slow conduction of electrical impulses that control your heart rate. Your doctor can teach you how to do vagal maneuvers safely.  Notify your doctor or go to the nearest emergency room if your heart rate doesn't slow and symptoms persist. Low salt, low fat, low cholesterol, diabetic diet if appropriate  Continue medication as prescribed  Exercise, increase your activity level Low salt diet  Activity as tolerated.  Take all medication as prescribed.  Follow up with your medical doctor for routine blood pressure monitoring at your next visit.  Notify your doctor if you have any of the following symptoms:   Dizziness, Lightheadedness, Blurry vision, Headache, Chest pain, Shortness of breath Loperamide for 3 days as needed  Follow up with your PCP for stool studies You had AVNRT heart rhythm and received medications which helped. You had an EP ablation on 10/27 and tolerated it well. Continue to take your metoprolol as prescribed.  You may have symptoms such as dizziness, chest pain, or fainting (syncope) that are caused by your fast heart rate.  Take your medication as prescribed.  If episodes of supraventricular tachycardia (SVT) start suddenly and cause symptoms, you can try vagal maneuvers—such as holding your breath and bearing down (Valsalva maneuver), or coughing. These simple maneuvers stimulate the vagus nerve, which can slow conduction of electrical impulses that control your heart rate. Your doctor can teach you how to do vagal maneuvers safely.  Notify your doctor or go to the nearest emergency room if your heart rate doesn't slow and symptoms persist. Low salt, low fat, low cholesterol, diabetic diet if appropriate  Continue medication as prescribed  Exercise, increase your activity level  Cut down on smoking cigards  Repeat your cholesterol levels with your PCP Hold off on losartan and Hydrochlorothiazide. Follow up your BP with your PCP.  Low salt diet  Activity as tolerated.  Follow up with your medical doctor for routine blood pressure monitoring at your next visit.  Notify your doctor if you have any of the following symptoms:   Dizziness, Lightheadedness, Blurry vision, Headache, Chest pain, Shortness of breath Loperamide for 3 days as needed  Follow up with your PCP for stool studies if your symptoms are persistent.  If you experience worsening diarrhea, n/v, abd cramping, report it to your doctor and go to the ED.

## 2017-10-28 NOTE — PROGRESS NOTE ADULT - PROBLEM SELECTOR PLAN 2
C/O 10 ep of small nonbloody loose BMs, no recent abx use, no hx of frequent diarrhea, without fevers/chills/abd pain, now slowing down per pt.  - monitor  - pt insists he has flight and needs to leave hospital today - given low concern for infectious etiology given pt clinically appears well and HD stable, will prescribe loperamide short course to take as needed for his flight. Advise pt to follow up closely with PCP in few days and for stool testing if symptoms persist. Discussed risk of c diff while inpatient and he understands r/b/a and would like to go home with follow up.

## 2017-11-14 DIAGNOSIS — E83.119 HEMOCHROMATOSIS, UNSPECIFIED: ICD-10-CM

## 2017-11-14 DIAGNOSIS — I10 ESSENTIAL (PRIMARY) HYPERTENSION: ICD-10-CM

## 2017-11-14 DIAGNOSIS — E78.5 HYPERLIPIDEMIA, UNSPECIFIED: ICD-10-CM

## 2017-11-14 DIAGNOSIS — R00.2 PALPITATIONS: ICD-10-CM

## 2017-11-14 DIAGNOSIS — Z87.898 PERSONAL HISTORY OF OTHER SPECIFIED CONDITIONS: ICD-10-CM

## 2017-11-14 RX ORDER — ATORVASTATIN CALCIUM 40 MG/1
40 TABLET, FILM COATED ORAL DAILY
Qty: 30 | Refills: 11 | Status: ACTIVE | COMMUNITY

## 2017-11-14 RX ORDER — LOPERAMIDE HYDROCHLORIDE 2 MG/1
2 CAPSULE ORAL
Refills: 0 | Status: ACTIVE | COMMUNITY

## 2017-11-14 RX ORDER — METOPROLOL TARTRATE 25 MG/1
25 TABLET, FILM COATED ORAL TWICE DAILY
Qty: 30 | Refills: 0 | Status: ACTIVE | COMMUNITY

## 2017-11-15 ENCOUNTER — APPOINTMENT (OUTPATIENT)
Dept: ELECTROPHYSIOLOGY | Facility: CLINIC | Age: 62
End: 2017-11-15

## 2018-08-09 NOTE — DISCHARGE NOTE ADULT - NS AS DC FOLLOWUP STROKE INST
Quality 130: Documentation Of Current Medications In The Medical Record: Current Medications Documented Detail Level: Detailed Smoking Cessation

## 2021-02-02 NOTE — PROGRESS NOTE ADULT - PROBLEM SELECTOR PLAN 6
Refill request received from pharmacy. Medication pending for approval.       Last Office Visit With PCP:  08/13/2020    Next Visit Date:  No future appointments.     DACIA AYON
dvt ppx: ambulating  No PT/OT needs  Dispo: D/C today. D/C TIME 60 minutes. Evaled by EP and stable for d/c. Advised smoking cessation (cigars), close f/u by next week with cardiology and PCP regarding liquid stools. If pt experiences fevers, chills, abd pain, to report to his ED or see PCP. Pt voiced understanding.

## 2022-05-26 NOTE — PATIENT PROFILE ADULT. - VISION (WITH CORRECTIVE LENSES IF THE PATIENT USUALLY WEARS THEM):
Normal vision: sees adequately in most situations; can see medication labels, newsprint Adbry Pregnancy And Lactation Text: It is unknown if this medication will adversely affect pregnancy or breast feeding.

## 2023-08-16 NOTE — ED PROVIDER NOTE - NS_EDPROVIDERDISPOUSERTYPE_ED_A_ED
37.2 Scribe Attestation (For Scribes USE Only)... Attending Attestation (For Attendings USE Only).../Scribe Attestation (For Scribes USE Only)...